# Patient Record
Sex: FEMALE | Race: WHITE | HISPANIC OR LATINO | Employment: OTHER | URBAN - METROPOLITAN AREA
[De-identification: names, ages, dates, MRNs, and addresses within clinical notes are randomized per-mention and may not be internally consistent; named-entity substitution may affect disease eponyms.]

---

## 2017-01-05 ENCOUNTER — GENERIC CONVERSION - ENCOUNTER (OUTPATIENT)
Dept: OTHER | Facility: OTHER | Age: 67
End: 2017-01-05

## 2017-01-09 ENCOUNTER — ALLSCRIPTS OFFICE VISIT (OUTPATIENT)
Dept: OTHER | Facility: OTHER | Age: 67
End: 2017-01-09

## 2017-01-09 LAB
FLUAV AG SPEC QL IA: NEGATIVE
INFLUENZA B AG (HISTORICAL): NEGATIVE

## 2017-02-03 ENCOUNTER — ALLSCRIPTS OFFICE VISIT (OUTPATIENT)
Dept: OTHER | Facility: OTHER | Age: 67
End: 2017-02-03

## 2017-02-10 DIAGNOSIS — E66.9 OBESITY: ICD-10-CM

## 2017-02-10 DIAGNOSIS — Z00.00 ENCOUNTER FOR GENERAL ADULT MEDICAL EXAMINATION WITHOUT ABNORMAL FINDINGS: ICD-10-CM

## 2017-02-10 DIAGNOSIS — R05.9 COUGH: ICD-10-CM

## 2017-02-10 DIAGNOSIS — E55.9 VITAMIN D DEFICIENCY: ICD-10-CM

## 2017-02-10 DIAGNOSIS — J02.9 ACUTE PHARYNGITIS: ICD-10-CM

## 2017-02-10 DIAGNOSIS — R73.9 HYPERGLYCEMIA: ICD-10-CM

## 2017-02-14 ENCOUNTER — ALLSCRIPTS OFFICE VISIT (OUTPATIENT)
Dept: OTHER | Facility: OTHER | Age: 67
End: 2017-02-14

## 2017-02-14 LAB — S PYO AG THROAT QL: NEGATIVE

## 2017-05-31 ENCOUNTER — APPOINTMENT (OUTPATIENT)
Dept: LAB | Facility: CLINIC | Age: 67
End: 2017-05-31
Payer: MEDICARE

## 2017-05-31 DIAGNOSIS — R73.9 HYPERGLYCEMIA: ICD-10-CM

## 2017-05-31 DIAGNOSIS — E55.9 VITAMIN D DEFICIENCY: ICD-10-CM

## 2017-05-31 DIAGNOSIS — Z00.00 ENCOUNTER FOR GENERAL ADULT MEDICAL EXAMINATION WITHOUT ABNORMAL FINDINGS: ICD-10-CM

## 2017-05-31 DIAGNOSIS — R76.0 RAISED ANTIBODY TITER: ICD-10-CM

## 2017-05-31 DIAGNOSIS — J02.9 ACUTE PHARYNGITIS: ICD-10-CM

## 2017-05-31 DIAGNOSIS — E66.9 OBESITY: ICD-10-CM

## 2017-05-31 LAB
25(OH)D3 SERPL-MCNC: 26.3 NG/ML (ref 30–100)
ALBUMIN SERPL BCP-MCNC: 3.8 G/DL (ref 3.5–5)
ALP SERPL-CCNC: 82 U/L (ref 46–116)
ALT SERPL W P-5'-P-CCNC: 21 U/L (ref 12–78)
ANION GAP SERPL CALCULATED.3IONS-SCNC: 4 MMOL/L (ref 4–13)
AST SERPL W P-5'-P-CCNC: 18 U/L (ref 5–45)
BASOPHILS # BLD AUTO: 0.04 THOUSANDS/ΜL (ref 0–0.1)
BASOPHILS NFR BLD AUTO: 1 % (ref 0–1)
BILIRUB SERPL-MCNC: 0.56 MG/DL (ref 0.2–1)
BUN SERPL-MCNC: 11 MG/DL (ref 5–25)
CALCIUM SERPL-MCNC: 9.3 MG/DL (ref 8.3–10.1)
CHLORIDE SERPL-SCNC: 110 MMOL/L (ref 100–108)
CHOLEST SERPL-MCNC: 227 MG/DL (ref 50–200)
CO2 SERPL-SCNC: 30 MMOL/L (ref 21–32)
CREAT SERPL-MCNC: 0.75 MG/DL (ref 0.6–1.3)
EOSINOPHIL # BLD AUTO: 0.26 THOUSAND/ΜL (ref 0–0.61)
EOSINOPHIL NFR BLD AUTO: 4 % (ref 0–6)
ERYTHROCYTE [DISTWIDTH] IN BLOOD BY AUTOMATED COUNT: 13 % (ref 11.6–15.1)
ERYTHROCYTE [SEDIMENTATION RATE] IN BLOOD: 11 MM/HOUR (ref 0–20)
EST. AVERAGE GLUCOSE BLD GHB EST-MCNC: 114 MG/DL
GFR SERPL CREATININE-BSD FRML MDRD: >60 ML/MIN/1.73SQ M
GLUCOSE P FAST SERPL-MCNC: 95 MG/DL (ref 65–99)
HBA1C MFR BLD: 5.6 % (ref 4.2–6.3)
HCT VFR BLD AUTO: 40.1 % (ref 34.8–46.1)
HDLC SERPL-MCNC: 46 MG/DL (ref 40–60)
HGB BLD-MCNC: 13.3 G/DL (ref 11.5–15.4)
LDLC SERPL CALC-MCNC: 130 MG/DL (ref 0–100)
LYMPHOCYTES # BLD AUTO: 2.32 THOUSANDS/ΜL (ref 0.6–4.47)
LYMPHOCYTES NFR BLD AUTO: 37 % (ref 14–44)
MCH RBC QN AUTO: 31.7 PG (ref 26.8–34.3)
MCHC RBC AUTO-ENTMCNC: 33.2 G/DL (ref 31.4–37.4)
MCV RBC AUTO: 96 FL (ref 82–98)
MONOCYTES # BLD AUTO: 0.48 THOUSAND/ΜL (ref 0.17–1.22)
MONOCYTES NFR BLD AUTO: 8 % (ref 4–12)
NEUTROPHILS # BLD AUTO: 3.25 THOUSANDS/ΜL (ref 1.85–7.62)
NEUTS SEG NFR BLD AUTO: 50 % (ref 43–75)
NRBC BLD AUTO-RTO: 0 /100 WBCS
PLATELET # BLD AUTO: 233 THOUSANDS/UL (ref 149–390)
PMV BLD AUTO: 12.8 FL (ref 8.9–12.7)
POTASSIUM SERPL-SCNC: 4.5 MMOL/L (ref 3.5–5.3)
PROT SERPL-MCNC: 7.2 G/DL (ref 6.4–8.2)
RBC # BLD AUTO: 4.2 MILLION/UL (ref 3.81–5.12)
SODIUM SERPL-SCNC: 144 MMOL/L (ref 136–145)
T4 FREE SERPL-MCNC: 0.7 NG/DL (ref 0.76–1.46)
TRIGL SERPL-MCNC: 256 MG/DL
TSH SERPL DL<=0.05 MIU/L-ACNC: 3.46 UIU/ML (ref 0.36–3.74)
WBC # BLD AUTO: 6.36 THOUSAND/UL (ref 4.31–10.16)

## 2017-05-31 PROCEDURE — 85025 COMPLETE CBC W/AUTO DIFF WBC: CPT

## 2017-05-31 PROCEDURE — 84443 ASSAY THYROID STIM HORMONE: CPT

## 2017-05-31 PROCEDURE — 80053 COMPREHEN METABOLIC PANEL: CPT

## 2017-05-31 PROCEDURE — 85652 RBC SED RATE AUTOMATED: CPT

## 2017-05-31 PROCEDURE — 84439 ASSAY OF FREE THYROXINE: CPT

## 2017-05-31 PROCEDURE — 80061 LIPID PANEL: CPT

## 2017-05-31 PROCEDURE — 83036 HEMOGLOBIN GLYCOSYLATED A1C: CPT

## 2017-05-31 PROCEDURE — 82306 VITAMIN D 25 HYDROXY: CPT

## 2017-05-31 PROCEDURE — 36415 COLL VENOUS BLD VENIPUNCTURE: CPT

## 2017-06-02 ENCOUNTER — GENERIC CONVERSION - ENCOUNTER (OUTPATIENT)
Dept: OTHER | Facility: OTHER | Age: 67
End: 2017-06-02

## 2017-08-15 ENCOUNTER — ALLSCRIPTS OFFICE VISIT (OUTPATIENT)
Dept: OTHER | Facility: OTHER | Age: 67
End: 2017-08-15

## 2017-08-15 DIAGNOSIS — R50.9 FEVER: ICD-10-CM

## 2017-08-15 DIAGNOSIS — R94.6 ABNORMAL RESULTS OF THYROID FUNCTION STUDIES: ICD-10-CM

## 2017-08-15 DIAGNOSIS — Z00.00 ENCOUNTER FOR GENERAL ADULT MEDICAL EXAMINATION WITHOUT ABNORMAL FINDINGS: ICD-10-CM

## 2017-08-15 DIAGNOSIS — N95.1 FEMALE CLIMACTERIC STATE: ICD-10-CM

## 2017-09-18 ENCOUNTER — GENERIC CONVERSION - ENCOUNTER (OUTPATIENT)
Dept: OTHER | Facility: OTHER | Age: 67
End: 2017-09-18

## 2017-12-05 ENCOUNTER — GENERIC CONVERSION - ENCOUNTER (OUTPATIENT)
Dept: OTHER | Facility: OTHER | Age: 67
End: 2017-12-05

## 2017-12-20 ENCOUNTER — GENERIC CONVERSION - ENCOUNTER (OUTPATIENT)
Dept: FAMILY MEDICINE CLINIC | Facility: CLINIC | Age: 67
End: 2017-12-20

## 2018-01-12 VITALS
OXYGEN SATURATION: 97 % | HEART RATE: 86 BPM | TEMPERATURE: 97.7 F | RESPIRATION RATE: 16 BRPM | DIASTOLIC BLOOD PRESSURE: 66 MMHG | WEIGHT: 176.38 LBS | BODY MASS INDEX: 33.3 KG/M2 | HEIGHT: 61 IN | SYSTOLIC BLOOD PRESSURE: 122 MMHG

## 2018-01-12 NOTE — MISCELLANEOUS
Message  left message to increase vit d 3 by 1,000 units/day      Signatures   Electronically signed by : Juan Jose Hoff DO; Jun 2 2017 10:29AM EST                       (Author)

## 2018-01-13 VITALS
SYSTOLIC BLOOD PRESSURE: 144 MMHG | HEIGHT: 61 IN | TEMPERATURE: 98.5 F | DIASTOLIC BLOOD PRESSURE: 80 MMHG | BODY MASS INDEX: 33.42 KG/M2 | RESPIRATION RATE: 16 BRPM | OXYGEN SATURATION: 94 % | WEIGHT: 177 LBS | HEART RATE: 82 BPM

## 2018-01-14 VITALS
DIASTOLIC BLOOD PRESSURE: 70 MMHG | SYSTOLIC BLOOD PRESSURE: 140 MMHG | HEIGHT: 61 IN | WEIGHT: 188.38 LBS | OXYGEN SATURATION: 95 % | TEMPERATURE: 98.5 F | RESPIRATION RATE: 16 BRPM | HEART RATE: 73 BPM | BODY MASS INDEX: 35.57 KG/M2

## 2018-01-14 VITALS
HEIGHT: 61 IN | HEART RATE: 56 BPM | OXYGEN SATURATION: 98 % | SYSTOLIC BLOOD PRESSURE: 122 MMHG | DIASTOLIC BLOOD PRESSURE: 70 MMHG | TEMPERATURE: 97.9 F | BODY MASS INDEX: 33.11 KG/M2 | WEIGHT: 175.38 LBS | RESPIRATION RATE: 16 BRPM

## 2018-01-23 NOTE — PROGRESS NOTES
Assessment   1  Dyspepsia (536 8) (R10 13)  2  DJD (degenerative joint disease) (715 90) (M19 90)  3  Hypertension (401 9) (I10)   · having hypotensive s/s - outpt readings  4  Dyslipidemia (272 4) (E78 5)  5  Lupus anticoagulant positive (795 79) (R76 0)  6  Sick sinus syndrome (427 81) (I49 5)    Plan  Dyslipidemia    · Renew: Rosuvastatin Calcium 5 MG Oral Tablet (Crestor); TAKE 1 TABLET DAILY  Dyspepsia    · Start: Omeprazole 20 MG Oral Capsule Delayed Release; TAKE 1 CAPSULE Daily  Encounter for gynecological examination    · (1) THIN PREP PAP WITH IMAGING; Status:Temporary Deferral - Pt refuses;     12/14/2017  PAP Maturation index required? : No  Reflex HPV? : Never  Health Maintenance    · *VB - Urinary Incontinence Screen (Dx Z13 89 Screen for UI); Status:Resulted - Requires  Verification;   Done: 67XQT4565 12:00AM  Hypertension    · Renew: Lisinopril 20 MG Oral Tablet; Take 1 tablet daily  PMH: Acute nonsuppurative otitis media of right ear    · Continue: Fluticasone Propionate 50 MCG/ACT Nasal Suspension (Flonase); USE 2  SPRAYS IN EACH NOSTRIL ONCE DAILY  Screening for osteoporosis    · * DXA BONE DENSITY SPINE HIP AND PELVIS; Status:Temporary Deferral - Pt refuses;     12/14/2017  Vertigo    · Continue: Meclizine HCl - 12 5 MG Oral Tablet; TAKE 1 TABLET 3 TIMES DAILY AS  NEEDED    Discussion/Summary    Rto 2 months  Impression: Subsequent Annual Wellness Visit  Cardiovascular screening and counseling: the risks and benefits of screening were discussed and screening is current  Diabetes screening and counseling: the risks and benefits of screening were discussed and screening is current  Colorectal cancer screening and counseling: the risks and benefits of screening were discussed and screening is current  Breast cancer screening and counseling: the risks and benefits of screening were discussed and screening is current     Cervical cancer screening and counseling: the risks and benefits of screening were discussed and the patient declines screening  Osteoporosis screening and counseling: the risks and benefits of screening were discussed and the patient declines screening  Glaucoma screening and counseling: the risks and benefits of screening were discussed and screening is current  Immunizations: the risks and benefits of influenza vaccination were discussed with the patient, the patient declines the influenza vaccination, the risks and benefits of pneumococcal vaccination were discussed with the patient and the patient declines the pneumococcal vaccination  Advance Directive Planning: not complete  Patient Discussion: plan discussed with the patient, follow-up visit needed in one year  Chief Complaint  patient presented here for annual well visit      History of Present Illness  Welcome to Medicare and Wellness Visits: The patient is being seen for the initial annual wellness visit  Medicare Screening and Risk Factors   Hospitalizations: she has been previously hospitalizied  Once per lifetime medicare screening tests: ECG  Medicare Screening Tests Risk Questions   Abdominal aortic aneurysm risk assessment: family history of AAA  Osteoporosis risk assessment: female gender and over 48years of age  HIV risk assessment: denies prbc's  Drug and Alcohol Use: The patient is a former cigarette smoker and quit 6 yrs ago 1/2ppd x 45 yrs  The patient reports occasional alcohol use  She has never used illicit drugs  Diet and Physical Activity: Current diet includes well balanced meals  She exercises daily  Exercise: walking, leg exercise  Mood Disorder and Cognitive Impairment Screening: She denies feeling down, depressed, or hopeless over the past two weeks  She denies feeling little interest or pleasure in doing things over the past two weeks     Cognitive impairment screening: denies difficulty learning/retaining new information, denies difficulty handling complex tasks, denies difficulty with reasoning, denies difficulty with spatial ability and orientation, denies difficulty with language and denies difficulty with behavior  Functional Ability/Level of Safety: Hearing is normal bilaterally and a hearing aid is not used  The patient is currently able to do activities of daily living without limitations, able to do instrumental activities of daily living without limitations, able to participate in social activities without limitations and able to drive without limitations  Activities of daily living details: does not need help using the phone, no transportation help needed, does not need help shopping, no meal preparation help needed, does not need help doing housework, does not need help doing laundry, does not need help managing medications and does not need help managing money  Fall risk factors:  mobility impairment, but no deconditioning, no visual impairment, no urinary incontinence, no cognitive impairment and no previous fall  Home safety risk factors:  has grab bars, but grab bars in the bathroom  Advance Directives: Advance directives: no living will and no advance directives  end of life decisions were reviewed with the patient and I agree with the patient's decisions  Co-Managers and Medical Equipment/Suppliers: See Patient Care Team      Patient Care Team    Care Team Member Role Specialty Office Number   Kaylyn MCCORD  Specialist Endocrinology [phone]   [facility] Specialist Hematology Oncology [phone] [address] [facility] Specialist Orthopedic Surgery [phone]     Review of Systems    Constitutional: negative  Eyes: negative  ENT: negative  Cardiovascular: negative  Respiratory: negative  Active Problems   1  Abnormal thyroid screen (blood) (794 5) (R94 6)  2  Anxiety (300 00) (F41 9)  3  Back pain (724 5) (M54 9)  4  Chronic back pain (724 5,338 29) (M54 9,G89 29)  5  DJD (degenerative joint disease) (715 90) (M19 90)  6  Dyslipidemia (272 4) (E78 5)  7  Eczema (692 9) (L30 9)  8  Encounter for screening mammogram for breast cancer (V76 12) (Z12 31)  9  History of bilateral knee replacement (V43 65) (Z96 653)  10  Hypertension (401 9) (I10)  11  Impaired fasting glucose (790 21) (R73 01)  12  Lupus anticoagulant positive (795 79) (R76 0)  13  Nasal polyp (471 9) (J33 9)  14  Other abnormal glucose (790 29) (R73 09)  15  Pharyngitis (462) (J02 9)  16  Prolonged PTT (790 92) (R79 1)  17  Seasonal allergies (477 9) (J30 2)  18  Sick sinus syndrome (427 81) (I49 5)  19  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)  20  Vertigo (780 4) (R42)  21   Vitamin D deficiency (268 9) (E55 9)    Past Medical History    · History of Abnormal findings on diagnostic imaging of other specified body structures  (793 99) (R93 8)   · History of Acute atopic conjunctivitis, unspecified laterality (372 05) (H10 10)   · History of Acute nonsuppurative otitis media of right ear (381 00) (H65 191)   · History of Acute pain of both knees (338 19,719 46) (M25 561,M25 562)   · History of Acute recurrent maxillary sinusitis (461 0) (J01 01)   · History of Allergic rhinitis due to pollen (477 0) (J30 1)   · History of Backache (724 5) (M54 9)   · History of Bursitis of hip, unspecified laterality (726 5) (M70 70)   · History of Dyspepsia (536 8) (K30)   · History of Dysuria (788 1) (R30 0)   · History of Ecchymosis (459 89) (R58)   · History of Hip Sprain (843 9)   · History of acute sinusitis (V12 69) (Z87 09)   · History of acute sinusitis (V12 69) (Z87 09)   · History of constipation (V12 79) (Z87 19)   · History of diarrhea (V12 79) (I81 876)   · History of fatigue (V13 89) (Z87 898)   · History of sinus bradycardia (V12 59) (Z86 79)   · History of upper respiratory infection (V12 09) (Z87 09)   · History of Leg swelling (729 81) (M79 89)   · History of Lump or mass in breast (611 72) (N63)   · History of Osteoarthritis (715 90) (M19 90)   · History of Polyp of sigmoid colon (211 3) (D12 5)   · History of Preoperative clearance (V72 84) (Z01 818)   · History of Temporomandibular dysfunction syndrome (524 60) (M26 609)   · History of Upper respiratory infection (465 9) (J06 9)   · History of URTI (acute upper respiratory infection) (465 9) (J06 9)   · History of Vaginitis (616 10) (N76 0)    The active problems and past medical history were reviewed and updated today  Surgical History    · History of Breast Surgery Lumpectomy   · History of Tubal Ligation    The surgical history was reviewed and updated today  Family History  Mother    · Family history of Mother  At Age 80  Father    · Family history of Father  At Age 67   · Family history of Type 2 diabetes mellitus without complication  Sister    · Family history of Type 2 diabetes mellitus without complication  Brother    · Family history of Type 2 diabetes mellitus without complication  Family History    · Family history of essential hypertension (V17 49) (Z82 49)   · Family history of Hay fever    The family history was reviewed and updated today  Social History    · Being A Social Drinker   · Daily caffeine consumption   · Denied: History of Drug use   · Former smoker (V15 82) (A65 435)   · 1/2 or less x 45yrs  The social history was reviewed and updated today  The social history was reviewed and is unchanged  Current Meds  1  Fluticasone Propionate 50 MCG/ACT Nasal Suspension; USE 2 SPRAYS IN EACH   NOSTRIL ONCE DAILY; Therapy: 06XSE9379 to (Silviano Olmedo)  Requested for: 75Bak1566; Last   Rx:2016 Ordered  2  Ibuprofen 800 MG Oral Tablet; take 1 tablet twice a day as needed; Therapy: 70Ifc2920 to (Last Rx:60Zyp3103)  Requested for: 75FVH4457 Ordered  3  Lidocaine 5 % External Patch; APPLY 1 PATCH TO THE AFFECTED AREA AND LEAVE   IN PLACE FOR 12 HOURS, THEN REMOVE AND LEAVE OFF FOR 12 HOURS;    Therapy: 27Nbs9034 to (Evaluate:72Yuq6940)  Requested for: 87Qij7930; Last   Rx:18Xin8139 Ordered  4  Lisinopril 20 MG Oral Tablet; Take 1 tablet daily; Therapy: 81Czp0721 to Recorded  5  Meclizine HCl - 12 5 MG Oral Tablet; TAKE 1 TABLET 3 TIMES DAILY AS NEEDED; Therapy: 91LUN5953 to (Jesuronel Multaniks)  Requested for: 74Pct0619; Last   Rx:00Ypb6275 Ordered  6  Motrin TABS Recorded  7  Rosuvastatin Calcium 5 MG Oral Tablet; TAKE 1 TABLET DAILY; Therapy: 53PAS0423 to (Evaluate:38Fci5778)  Requested for: 17Uih0028; Last   Rx:90Cdp6733 Ordered  8  Systane 0 4-0 3 % Ophthalmic Solution; INSTILL 1 DROP IN AFFECTED EYE(S) EVERY   4-6 HOURS; Therapy: 36VAO1900 to (Last MT:68SLG8336)  Requested for: 56QXM5095 Ordered  9  Vitamin D3 5000 UNIT Oral Capsule; take 1a day; Therapy: 04WVN3713 to (Last Rx:09Jun2016)  Requested for: 30OYS8657 Ordered    The medication list was reviewed and updated today  Allergies   1  Metoprolol Tartrate TABS  2  Amoxicillin TABS  3  Augmentin TABS  4  Lipitor TABS    Immunizations   1    PCV  Temporarily Deferred: Pt refuses    Td/DT  25-Sep-2006     Vitals  Signs    Systolic: 970  Diastolic: 65   Temperature: 99 5 F, Tympanic  Heart Rate: 72  Pulse Quality: Normal  Respiration Quality: Normal  Respiration: 16  Systolic: 348, LUE, Sitting  Diastolic: 76, LUE, Sitting  Height: 5 ft 1 in  Weight: 177 lb   BMI Calculated: 33 44  BSA Calculated: 1 79  O2 Saturation: 98    Physical Exam    Constitutional   General appearance: No acute distress, well appearing and well nourished  Head and Face   Head and face: Normal     Palpation of the face and sinuses: No sinus tenderness  Eyes   Conjunctiva and lids: No swelling, erythema or discharge  Pupils and irises: Equal, round, reactive to light  Ears, Nose, Mouth, and Throat   External inspection of ears and nose: Normal     Otoscopic examination: Tympanic membranes translucent with normal light reflex  Canals patent without erythema      Hearing: Normal     Nasal mucosa, septum, and turbinates: Normal without edema or erythema  Lips, teeth, and gums: Normal, good dentition  Oropharynx: Normal with no erythema, edema, exudate or lesions  Neck   Neck: Supple, symmetric, trachea midline, no masses  Thyroid: Normal, no thyromegaly  Pulmonary   Respiratory effort: No increased work of breathing or signs of respiratory distress  Palpation of chest: Normal     Auscultation of lungs: Clear to auscultation  Cardiovascular   Auscultation of heart: Normal rate and rhythm, normal S1 and S2, no murmurs  Carotid pulses: 2+ bilaterally  Results/Data  Falls Risk Assessment (Dx Z13 89 Screen for Neurologic Disorder) 70GPP5036 10:19AM User, Ahs     Test Name Result Flag Reference   Falls Risk      No falls in the past year     PHQ-2 Adult Depression Screening 29MQJ5936 10:19AM User, Ahs     Test Name Result Flag Reference   PHQ-2 Adult Depression Score 0     Over the last two weeks, how often have you been bothered by any of the following problems?   Little interest or pleasure in doing things: Not at all - 0  Feeling down, depressed, or hopeless: Not at all - 0   PHQ-2 Adult Depression Screening Negative       *VB - Urinary Incontinence Screen (Dx Z13 89 Screen for UI) 30HFK9086 12:00AM Odalys Damon   negative     Test Name Result Flag Reference   Urinary Incontinence Assessment [id number]         Future Appointments    Date/Time Provider Specialty Site   02/14/2017 10:00 AM Odalys Damon DO Internal Medicine [address] [place] [address]   Electronically signed by : Madiha Bustos DO; Dec 14 2016 11:50AM EST                       (Author)

## 2018-01-23 NOTE — MISCELLANEOUS
Message  discussed her holter  she just got back from [facility] and they recommend a pacer        Signatures   Electronically signed by : Francisca Maxwell DO; Dec  5 2017 10:39AM EST                       (Author)

## 2018-01-31 ENCOUNTER — OFFICE VISIT (OUTPATIENT)
Dept: FAMILY MEDICINE CLINIC | Facility: CLINIC | Age: 68
End: 2018-01-31
Payer: MEDICARE

## 2018-01-31 VITALS
HEART RATE: 63 BPM | RESPIRATION RATE: 16 BRPM | BODY MASS INDEX: 35.87 KG/M2 | TEMPERATURE: 99.3 F | OXYGEN SATURATION: 99 % | DIASTOLIC BLOOD PRESSURE: 68 MMHG | WEIGHT: 190 LBS | HEIGHT: 61 IN | SYSTOLIC BLOOD PRESSURE: 138 MMHG

## 2018-01-31 DIAGNOSIS — Z00.00 MEDICARE ANNUAL WELLNESS VISIT, SUBSEQUENT: ICD-10-CM

## 2018-01-31 DIAGNOSIS — B02.29 HZV (HERPES ZOSTER VIRUS) POST HERPETIC NEURALGIA: Primary | ICD-10-CM

## 2018-01-31 PROCEDURE — G0439 PPPS, SUBSEQ VISIT: HCPCS | Performed by: INTERNAL MEDICINE

## 2018-01-31 RX ORDER — OXYCODONE AND ACETAMINOPHEN 7.5; 325 MG/1; MG/1
1 TABLET ORAL EVERY 4 HOURS PRN
Qty: 30 TABLET | Refills: 0 | Status: SHIPPED | OUTPATIENT
Start: 2018-01-31 | End: 2018-08-01

## 2018-01-31 RX ORDER — MECLIZINE HCL 12.5 MG/1
1 TABLET ORAL 3 TIMES DAILY PRN
COMMUNITY
Start: 2016-05-06 | End: 2018-08-01

## 2018-01-31 RX ORDER — IBUPROFEN 800 MG/1
1 TABLET ORAL 2 TIMES DAILY PRN
COMMUNITY
Start: 2016-08-31 | End: 2019-04-01 | Stop reason: ALTCHOICE

## 2018-01-31 RX ORDER — FEXOFENADINE HYDROCHLORIDE 60 MG/1
60 TABLET, FILM COATED ORAL
COMMUNITY
Start: 2011-11-08 | End: 2020-03-19 | Stop reason: SDUPTHER

## 2018-01-31 RX ORDER — LISINOPRIL 20 MG/1
30 TABLET ORAL DAILY
COMMUNITY
Start: 2016-08-31 | End: 2018-10-30 | Stop reason: SDUPTHER

## 2018-01-31 RX ORDER — LIDOCAINE 50 MG/G
1 PATCH TOPICAL
COMMUNITY
Start: 2016-08-31 | End: 2020-07-20 | Stop reason: SDUPTHER

## 2018-01-31 RX ORDER — CAPSAICIN 0.025 %
CREAM (GRAM) TOPICAL
COMMUNITY
Start: 2018-01-29 | End: 2018-01-31

## 2018-01-31 RX ORDER — HYDROCODONE BITARTRATE AND ACETAMINOPHEN 5; 300 MG/1; MG/1
TABLET ORAL
Refills: 0 | COMMUNITY
Start: 2018-01-29 | End: 2018-08-01

## 2018-01-31 RX ORDER — OMEPRAZOLE 20 MG/1
1 CAPSULE, DELAYED RELEASE ORAL DAILY
COMMUNITY
Start: 2016-12-14 | End: 2020-03-19 | Stop reason: SDUPTHER

## 2018-01-31 RX ORDER — DULOXETIN HYDROCHLORIDE 60 MG/1
60 CAPSULE, DELAYED RELEASE ORAL DAILY
Qty: 30 CAPSULE | Refills: 5 | Status: SHIPPED | OUTPATIENT
Start: 2018-01-31 | End: 2018-08-01

## 2018-01-31 RX ORDER — ROSUVASTATIN CALCIUM 5 MG/1
1 TABLET, COATED ORAL DAILY
COMMUNITY
Start: 2016-07-25 | End: 2018-05-07 | Stop reason: SDUPTHER

## 2018-01-31 NOTE — PROGRESS NOTES
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  Comments:  tx with dome[place] soaks and add cymbalta for neuropathy    Medicare annual wellness visit, subsequent    Other orders  -     Cancel: Ambulatory referral to Gastroenterology; Future  -     Cancel: Ambulatory referral to Colorectal Surgery; Future  -     Cancel: Tdap vaccine greater than or equal to 8yo IM  -     Cancel: Ambulatory referral to Gastroenterology; Future  -     aspirin (ASPIRIN LOW DOSE) 81 MG tablet; Take by mouth  -     Discontinue: capsaicin (ZOSTRIX) 0 025 % cream; Apply topically  -     Cholecalciferol 1000 units capsule; Take 1,000 Units by mouth  -     fexofenadine (ALLEGRA) 60 MG tablet; Take 60 mg by mouth  -     HYDROcodone-acetaminophen (XODOL) 5-300 MG per tablet; take 1 tablet by mouth every 6 hours if needed for MODERATE PAIN,MAX DAILY AMOUNT:4 TABLETS  -     ibuprofen (MOTRIN) 800 mg tablet; Take 1 tablet by mouth 2 (two) times a day as needed  -     lidocaine (LIDODERM) 5 %; Apply 1 patch topically  -     lisinopril (ZESTRIL) 20 mg tablet; Take 1 tablet by mouth daily  -     meclizine (ANTIVERT) 12 5 MG tablet; Take 1 tablet by mouth 3 (three) times a day as needed  -     Multiple Vitamin (MULTIVITAMINS PO); Take by mouth  -     omeprazole (PriLOSEC) 20 mg delayed release capsule; Take 1 capsule by mouth daily  -     rosuvastatin (CRESTOR) 5 mg tablet; Take 1 tablet by mouth daily          Subjective:      Patient ID: Arya Zarco is a 79 y o  female  Pt has shingles and was diagnosed in er and was given valtrex  This was mid January  She was also given prednisone  She went back to er and given capsacian  She stopped because it burnt her  She was given oxycodone  She says it lasts 3 hrs            The following portions of the patient's history were reviewed and updated as appropriate: She  has a past medical history of Anxiety; Chronic back pain; Degenerative joint disease; Depression; Dyslipidemia; Hyperglycemia; Lupus anticoagulant positive; Osteoarthritis; Seasonal allergies; Sick sinus syndrome ([facility] [address] ); Sinus bradycardia; Tachycardia-bradycardia syndrome ([facility] [address] ); Total knee replacement status; and Vitamin D deficiency  She  does not have a problem list on file  She  has a past surgical history that includes Breast lumpectomy (Bilateral); Knee surgery; and Tubal ligation  Her family history includes Allergies in her family; Diabetes type II in her brother, father, and sister; Hypertension in her family; Stroke in her father and mother  She  reports that she has quit smoking  She has a 22 50 pack-year smoking history  She has never used smokeless tobacco  She reports that she drinks alcohol  She reports that she does not use drugs  Current Outpatient Prescriptions   Medication Sig Dispense Refill    aspirin (ASPIRIN LOW DOSE) 81 MG tablet Take by mouth      Cholecalciferol 1000 units capsule Take 1,000 Units by mouth      fexofenadine (ALLEGRA) 60 MG tablet Take 60 mg by mouth      HYDROcodone-acetaminophen (XODOL) 5-300 MG per tablet take 1 tablet by mouth every 6 hours if needed for MODERATE PAIN,MAX DAILY AMOUNT:4 TABLETS  0    ibuprofen (MOTRIN) 800 mg tablet Take 1 tablet by mouth 2 (two) times a day as needed      lidocaine (LIDODERM) 5 % Apply 1 patch topically      lisinopril (ZESTRIL) 20 mg tablet Take 1 tablet by mouth daily      meclizine (ANTIVERT) 12 5 MG tablet Take 1 tablet by mouth 3 (three) times a day as needed      Multiple Vitamin (MULTIVITAMINS PO) Take by mouth      omeprazole (PriLOSEC) 20 mg delayed release capsule Take 1 capsule by mouth daily      rosuvastatin (CRESTOR) 5 mg tablet Take 1 tablet by mouth daily       No current facility-administered medications for this visit  No current outpatient prescriptions on file prior to visit  No current facility-administered medications on file prior to visit  She is allergic to amoxicillin; atorvastatin; augmentin es-600  [amoxicillin-pot clavulanate]; metoprolol; penicillins; and wound dressings       Review of Systems   Constitutional: Negative  HENT: Negative  Respiratory: Negative  Cardiovascular: Negative  Skin: Positive for rash  All shingle lesions are crusted over  Objective:     Physical Exam   Constitutional: She appears well-developed and well-nourished  HENT:   Head: Normocephalic and atraumatic  Right Ear: External ear normal    Left Ear: External ear normal    Neck: Normal range of motion  Neck supple  Cardiovascular: Normal rate and regular rhythm  Pulmonary/Chest: Effort normal and breath sounds normal    Skin: Rash noted     Scabbed over shingles

## 2018-01-31 NOTE — PATIENT INSTRUCTIONS
Use [place] soaks and will increase her pain med to percocet  She knows do not drive with percocet    Add cymbalta for neuropathy

## 2018-03-16 ENCOUNTER — OFFICE VISIT (OUTPATIENT)
Dept: FAMILY MEDICINE CLINIC | Facility: CLINIC | Age: 68
End: 2018-03-16
Payer: MEDICARE

## 2018-03-16 VITALS
DIASTOLIC BLOOD PRESSURE: 88 MMHG | WEIGHT: 189.4 LBS | OXYGEN SATURATION: 98 % | SYSTOLIC BLOOD PRESSURE: 142 MMHG | HEART RATE: 62 BPM | BODY MASS INDEX: 35.76 KG/M2 | TEMPERATURE: 97.5 F | HEIGHT: 61 IN

## 2018-03-16 DIAGNOSIS — G56.01 CARPAL TUNNEL SYNDROME OF RIGHT WRIST: Primary | ICD-10-CM

## 2018-03-16 PROBLEM — M19.90 DJD (DEGENERATIVE JOINT DISEASE): Status: ACTIVE | Noted: 2017-10-21

## 2018-03-16 PROBLEM — Z95.0 PACEMAKER: Status: ACTIVE | Noted: 2018-03-08

## 2018-03-16 PROCEDURE — 99213 OFFICE O/P EST LOW 20 MIN: CPT | Performed by: INTERNAL MEDICINE

## 2018-03-16 RX ORDER — MELOXICAM 15 MG/1
15 TABLET ORAL DAILY
Qty: 30 TABLET | Refills: 0 | Status: SHIPPED | OUTPATIENT
Start: 2018-03-16 | End: 2018-08-01

## 2018-03-16 NOTE — PROGRESS NOTES
Assessment/Plan:         Diagnoses and all orders for this visit:    Carpal tunnel syndrome of right wrist  Comments:  splint/nsaid  consider tx for neuropathy  it is near dermatome that she had for shingles about 1 month ago  she declines duloxitene for now  Orders:  -     meloxicam (MOBIC) 15 mg tablet; Take 1 tablet (15 mg total) by mouth daily  -     Splint    Other orders  -     metoprolol tartrate (LOPRESSOR) 25 mg tablet; Take 25 mg by mouth 2 (two) times a day          Subjective:      Patient ID: Espinoza Jain is a 76 y o  female  Numb in middle 2 fingers  It is near the dermatome of her shingles  The following portions of the patient's history were reviewed and updated as appropriate: She  has a past medical history of Anxiety; Chronic back pain; Degenerative joint disease; Depression; Dyslipidemia; Hyperglycemia; Lupus anticoagulant positive; Osteoarthritis; Seasonal allergies; Sick sinus syndrome ([address] ); Sinus bradycardia; Tachycardia-bradycardia syndrome ([address] ); Total knee replacement status; and Vitamin D deficiency  She There are no active problems to display for this patient  She  has a past surgical history that includes Breast lumpectomy (Bilateral); Knee surgery; and Tubal ligation  Her family history includes Allergies in her family; Diabetes type II in her brother, father, and sister; Hypertension in her family; Stroke in her father and mother  She  reports that she has quit smoking  She has a 22 50 pack-year smoking history  She has never used smokeless tobacco  She reports that she drinks alcohol  She reports that she does not use drugs    Current Outpatient Prescriptions   Medication Sig Dispense Refill    lisinopril (ZESTRIL) 20 mg tablet Take 10 mg by mouth daily        aspirin (ASPIRIN LOW DOSE) 81 MG tablet Take by mouth      Cholecalciferol 1000 units capsule Take 1,000 Units by mouth      DULoxetine (CYMBALTA) 60 mg delayed release capsule Take 1 capsule (60 mg total) by mouth daily 30 capsule 5    fexofenadine (ALLEGRA) 60 MG tablet Take 60 mg by mouth      HYDROcodone-acetaminophen (XODOL) 5-300 MG per tablet take 1 tablet by mouth every 6 hours if needed for MODERATE PAIN,MAX DAILY AMOUNT:4 TABLETS  0    ibuprofen (MOTRIN) 800 mg tablet Take 1 tablet by mouth 2 (two) times a day as needed      lidocaine (LIDODERM) 5 % Apply 1 patch topically      meclizine (ANTIVERT) 12 5 MG tablet Take 1 tablet by mouth 3 (three) times a day as needed      meloxicam (MOBIC) 15 mg tablet Take 1 tablet (15 mg total) by mouth daily 30 tablet 0    metoprolol tartrate (LOPRESSOR) 25 mg tablet Take 25 mg by mouth 2 (two) times a day  0    Multiple Vitamin (MULTIVITAMINS PO) Take by mouth      omeprazole (PriLOSEC) 20 mg delayed release capsule Take 1 capsule by mouth daily      oxyCODONE-acetaminophen (PERCOCET) 7 5-325 MG per tablet Take 1 tablet by mouth every 4 (four) hours as needed for moderate pain Max Daily Amount: 6 tablets 30 tablet 0    rosuvastatin (CRESTOR) 5 mg tablet Take 1 tablet by mouth daily       No current facility-administered medications for this visit        Current Outpatient Prescriptions on File Prior to Visit   Medication Sig    lisinopril (ZESTRIL) 20 mg tablet Take 10 mg by mouth daily      aspirin (ASPIRIN LOW DOSE) 81 MG tablet Take by mouth    Cholecalciferol 1000 units capsule Take 1,000 Units by mouth    DULoxetine (CYMBALTA) 60 mg delayed release capsule Take 1 capsule (60 mg total) by mouth daily    fexofenadine (ALLEGRA) 60 MG tablet Take 60 mg by mouth    HYDROcodone-acetaminophen (XODOL) 5-300 MG per tablet take 1 tablet by mouth every 6 hours if needed for MODERATE PAIN,MAX DAILY AMOUNT:4 TABLETS    ibuprofen (MOTRIN) 800 mg tablet Take 1 tablet by mouth 2 (two) times a day as needed    lidocaine (LIDODERM) 5 % Apply 1 patch topically    meclizine (ANTIVERT) 12 5 MG tablet Take 1 tablet by mouth 3 (three) times a day as needed    Multiple Vitamin (MULTIVITAMINS PO) Take by mouth    omeprazole (PriLOSEC) 20 mg delayed release capsule Take 1 capsule by mouth daily    oxyCODONE-acetaminophen (PERCOCET) 7 5-325 MG per tablet Take 1 tablet by mouth every 4 (four) hours as needed for moderate pain Max Daily Amount: 6 tablets    rosuvastatin (CRESTOR) 5 mg tablet Take 1 tablet by mouth daily     No current facility-administered medications on file prior to visit  She is allergic to amoxicillin; atorvastatin; augmentin es-600  [amoxicillin-pot clavulanate]; metoprolol; penicillins; and wound dressings       Review of Systems   Constitutional: Negative  HENT: Negative  Respiratory: Negative  Cardiovascular: Negative  Objective:      /88 (BP Location: Left arm, Patient Position: Sitting, Cuff Size: Standard)   Pulse 62   Temp 97 5 °F (36 4 °C)   Ht 5' 1" (1 549 m)   Wt 85 9 kg (189 lb 6 4 oz)   SpO2 98%   BMI 35 79 kg/m²          Physical Exam   Constitutional: She appears well-developed and well-nourished  HENT:   Head: Normocephalic and atraumatic  Neck: Normal range of motion  Neck supple  Cardiovascular: Normal rate and regular rhythm  Pulmonary/Chest: Effort normal and breath sounds normal    Musculoskeletal:   Pain with palp of her carpal tunnel

## 2018-03-16 NOTE — PATIENT INSTRUCTIONS
If pain persists told will order emg to r/o cts  If it is negative tx for neuropathy but she declines med for now  Asked her to call next week if pain persists and consider emb to r/o cts    If neg need to readdress neuropathy tx

## 2018-05-07 DIAGNOSIS — E78.5 DYSLIPIDEMIA: Primary | ICD-10-CM

## 2018-05-09 RX ORDER — ROSUVASTATIN CALCIUM 5 MG/1
5 TABLET, COATED ORAL DAILY
Qty: 30 TABLET | Refills: 0 | Status: SHIPPED | OUTPATIENT
Start: 2018-05-09 | End: 2018-05-14

## 2018-05-14 ENCOUNTER — TELEPHONE (OUTPATIENT)
Dept: FAMILY MEDICINE CLINIC | Facility: CLINIC | Age: 68
End: 2018-05-14

## 2018-05-14 DIAGNOSIS — E78.5 DYSLIPIDEMIA: Primary | ICD-10-CM

## 2018-05-14 RX ORDER — ROSUVASTATIN CALCIUM 5 MG/1
5 TABLET, COATED ORAL DAILY
Refills: 0 | Status: CANCELLED | OUTPATIENT
Start: 2018-05-14

## 2018-05-14 NOTE — TELEPHONE ENCOUNTER
PATIENT RECEIVED ROSUVASTATIN FROM [facility] SHE ONLY TAKES CRESTOR  CAN YOU PLEASE SEND KAMRAN 5M OZIEL KLEIN #90 TO [facility]

## 2018-05-16 RX ORDER — ROSUVASTATIN CALCIUM 5 MG/1
5 TABLET, COATED ORAL DAILY
Qty: 90 TABLET | Refills: 1 | Status: SHIPPED | OUTPATIENT
Start: 2018-05-16 | End: 2018-08-01

## 2018-05-16 NOTE — TELEPHONE ENCOUNTER
Patient returned your call, has been taking crestor for years prescribed by you  Patient is allergic to lipitor and does not want the generic crestor because she already knows she can tollerate the brand  [facility]  Brand Necessary

## 2018-08-01 ENCOUNTER — OFFICE VISIT (OUTPATIENT)
Dept: FAMILY MEDICINE CLINIC | Facility: CLINIC | Age: 68
End: 2018-08-01
Payer: MEDICARE

## 2018-08-01 VITALS
OXYGEN SATURATION: 94 % | SYSTOLIC BLOOD PRESSURE: 160 MMHG | BODY MASS INDEX: 35.5 KG/M2 | HEIGHT: 61 IN | WEIGHT: 188 LBS | DIASTOLIC BLOOD PRESSURE: 68 MMHG | RESPIRATION RATE: 16 BRPM | TEMPERATURE: 98.3 F | HEART RATE: 64 BPM

## 2018-08-01 DIAGNOSIS — I10 HYPERTENSION, UNSPECIFIED TYPE: ICD-10-CM

## 2018-08-01 DIAGNOSIS — K43.9 VENTRAL HERNIA WITHOUT OBSTRUCTION OR GANGRENE: ICD-10-CM

## 2018-08-01 DIAGNOSIS — Z13.820 SCREENING FOR OSTEOPOROSIS: Primary | ICD-10-CM

## 2018-08-01 PROCEDURE — 99214 OFFICE O/P EST MOD 30 MIN: CPT | Performed by: INTERNAL MEDICINE

## 2018-08-01 RX ORDER — LISINOPRIL 40 MG/1
40 TABLET ORAL DAILY
Qty: 30 TABLET | Refills: 1 | Status: SHIPPED | OUTPATIENT
Start: 2018-08-01 | End: 2018-08-30

## 2018-08-01 RX ORDER — ERGOCALCIFEROL 1.25 MG/1
50000 CAPSULE ORAL WEEKLY
Refills: 0 | COMMUNITY
Start: 2018-07-13 | End: 2018-12-20

## 2018-08-01 RX ORDER — ALPRAZOLAM 0.5 MG/1
TABLET ORAL
Refills: 0 | COMMUNITY
Start: 2018-04-28 | End: 2018-12-20

## 2018-08-01 NOTE — PATIENT INSTRUCTIONS
Consult gen surgery  Reviewed her lab  Will order u/s since it is located at her ru    Gave her dr Taz Hurley #

## 2018-08-01 NOTE — PROGRESS NOTES
Assessment/Plan:         Diagnoses and all orders for this visit:    Screening for osteoporosis  -     DXA bone density spine hip and pelvis; Future    Sliding hiatal hernia  Comments:  consult surgery    Other orders  -     ALPRAZolam (XANAX) 0 5 mg tablet; TAKE 1 TABLET NIGHT BEFORE PROCEDURE AND 1 TABLET 1 HR BEFORE PROCEDURE  -     ergocalciferol (VITAMIN D2) 50,000 units; Take 50,000 Units by mouth once a week          Subjective:      Patient ID: Belle Santo is a 76 y o  female  Pt developed abd pain  She feels a buldging in her rt upper quadrant  If she lays on her back the ridge goes away and she is more comfortable  She does not feel food aggravates her back pain  She gets pain with lifting - especially lifting around 30 to 50 lbs  If she lays on her rt lateral side it hurts and she can feel the bulge  She also gets occasional nausea  The following portions of the patient's history were reviewed and updated as appropriate:   She  has a past medical history of Anxiety; Chronic back pain; Degenerative joint disease; Depression; Dyslipidemia; Hyperglycemia; Lupus anticoagulant positive; Osteoarthritis; Seasonal allergies; Sick sinus syndrome ([address] ); Sinus bradycardia; Tachycardia-bradycardia syndrome ([address] ); Total knee replacement status; and Vitamin D deficiency  She   Patient Active Problem List    Diagnosis Date Noted    Pacemaker 03/08/2018    DJD (degenerative joint disease) 10/21/2017    Mixed hyperlipidemia 10/06/2016    Hypertension 06/07/2012     She  has a past surgical history that includes Breast lumpectomy (Bilateral); Knee surgery; and Tubal ligation  Her family history includes Allergies in her family; Diabetes type II in her brother, father, and sister; Hypertension in her family; Stroke in her father and mother  She  reports that she has quit smoking  She has a 22 50 pack-year smoking history  She has never used smokeless tobacco  She reports that she drinks alcohol  She reports that she does not use drugs  Current Outpatient Prescriptions   Medication Sig Dispense Refill    aspirin (ASPIRIN LOW DOSE) 81 MG tablet Take by mouth      ergocalciferol (VITAMIN D2) 50,000 units Take 50,000 Units by mouth once a week  0    fexofenadine (ALLEGRA) 60 MG tablet Take 60 mg by mouth      ibuprofen (MOTRIN) 800 mg tablet Take 1 tablet by mouth 2 (two) times a day as needed      lidocaine (LIDODERM) 5 % Apply 1 patch topically      lisinopril (ZESTRIL) 20 mg tablet Take 30 mg by mouth daily        Multiple Vitamin (MULTIVITAMINS PO) Take by mouth      omeprazole (PriLOSEC) 20 mg delayed release capsule Take 1 capsule by mouth daily      ALPRAZolam (XANAX) 0 5 mg tablet TAKE 1 TABLET NIGHT BEFORE PROCEDURE AND 1 TABLET 1 HR BEFORE PROCEDURE  0    Cholecalciferol 1000 units capsule Take 1,000 Units by mouth      metoprolol tartrate (LOPRESSOR) 25 mg tablet Take 25 mg by mouth 2 (two) times a day  0     No current facility-administered medications for this visit        Current Outpatient Prescriptions on File Prior to Visit   Medication Sig    aspirin (ASPIRIN LOW DOSE) 81 MG tablet Take by mouth    fexofenadine (ALLEGRA) 60 MG tablet Take 60 mg by mouth    ibuprofen (MOTRIN) 800 mg tablet Take 1 tablet by mouth 2 (two) times a day as needed    lidocaine (LIDODERM) 5 % Apply 1 patch topically    lisinopril (ZESTRIL) 20 mg tablet Take 30 mg by mouth daily      Multiple Vitamin (MULTIVITAMINS PO) Take by mouth    omeprazole (PriLOSEC) 20 mg delayed release capsule Take 1 capsule by mouth daily    Cholecalciferol 1000 units capsule Take 1,000 Units by mouth    metoprolol tartrate (LOPRESSOR) 25 mg tablet Take 25 mg by mouth 2 (two) times a day    [DISCONTINUED] DULoxetine (CYMBALTA) 60 mg delayed release capsule Take 1 capsule (60 mg total) by mouth daily    [DISCONTINUED] HYDROcodone-acetaminophen (XODOL) 5-300 MG per tablet take 1 tablet by mouth every 6 hours if needed for MODERATE PAIN,MAX DAILY AMOUNT:4 TABLETS    [DISCONTINUED] meclizine (ANTIVERT) 12 5 MG tablet Take 1 tablet by mouth 3 (three) times a day as needed    [DISCONTINUED] meloxicam (MOBIC) 15 mg tablet Take 1 tablet (15 mg total) by mouth daily    [DISCONTINUED] oxyCODONE-acetaminophen (PERCOCET) 7 5-325 MG per tablet Take 1 tablet by mouth every 4 (four) hours as needed for moderate pain Max Daily Amount: 6 tablets    [DISCONTINUED] rosuvastatin (CRESTOR) 5 mg tablet Take 1 tablet (5 mg total) by mouth daily Please dispense crestor and not generic     No current facility-administered medications on file prior to visit  She is allergic to amoxicillin; atorvastatin; augmentin es-600  [amoxicillin-pot clavulanate]; metoprolol; penicillins; latex; and wound dressings       Review of Systems   Constitutional: Negative  HENT: Negative  Respiratory: Negative  Cardiovascular: Negative  Gastrointestinal: Positive for abdominal distention and abdominal pain  Objective:      /68 (BP Location: Left arm, Patient Position: Sitting, Cuff Size: Large)   Pulse 64   Temp 98 3 °F (36 8 °C) (Tympanic)   Resp 16   Ht 5' 1" (1 549 m)   Wt 85 3 kg (188 lb)   SpO2 94%   BMI 35 52 kg/m²          Physical Exam   Constitutional: She appears well-developed and well-nourished  HENT:   Head: Normocephalic and atraumatic  Right Ear: External ear normal    Left Ear: External ear normal    Nose: Nose normal    Mouth/Throat: Oropharynx is clear and moist    Neck: Normal range of motion  Neck supple  Cardiovascular: Normal rate and regular rhythm      Pulmonary/Chest: Effort normal and breath sounds normal

## 2018-08-30 ENCOUNTER — OFFICE VISIT (OUTPATIENT)
Dept: FAMILY MEDICINE CLINIC | Facility: CLINIC | Age: 68
End: 2018-08-30
Payer: MEDICARE

## 2018-08-30 VITALS
DIASTOLIC BLOOD PRESSURE: 72 MMHG | HEIGHT: 61 IN | OXYGEN SATURATION: 98 % | TEMPERATURE: 98.2 F | WEIGHT: 186 LBS | RESPIRATION RATE: 16 BRPM | BODY MASS INDEX: 35.12 KG/M2 | SYSTOLIC BLOOD PRESSURE: 118 MMHG | HEART RATE: 72 BPM

## 2018-08-30 DIAGNOSIS — R42 VERTIGO: Primary | ICD-10-CM

## 2018-08-30 DIAGNOSIS — J32.9 SINUSITIS, UNSPECIFIED CHRONICITY, UNSPECIFIED LOCATION: ICD-10-CM

## 2018-08-30 PROCEDURE — 99213 OFFICE O/P EST LOW 20 MIN: CPT | Performed by: INTERNAL MEDICINE

## 2018-08-30 RX ORDER — MOMETASONE FUROATE 1 MG/G
CREAM TOPICAL
COMMUNITY
End: 2018-12-20 | Stop reason: SDUPTHER

## 2018-08-30 RX ORDER — MECLIZINE HCL 12.5 MG/1
12.5 TABLET ORAL 3 TIMES DAILY PRN
Qty: 30 TABLET | Refills: 1 | Status: SHIPPED | OUTPATIENT
Start: 2018-08-30 | End: 2021-11-04 | Stop reason: SDUPTHER

## 2018-08-30 RX ORDER — CEFPROZIL 500 MG/1
500 TABLET, FILM COATED ORAL 2 TIMES DAILY
Qty: 20 TABLET | Refills: 0 | Status: SHIPPED | OUTPATIENT
Start: 2018-08-30 | End: 2018-09-09

## 2018-08-30 NOTE — PROGRESS NOTES
Assessment/Plan:         Diagnoses and all orders for this visit:    Vertigo  -     meclizine (ANTIVERT) 12 5 MG tablet; Take 1 tablet (12 5 mg total) by mouth 3 (three) times a day as needed for dizziness  -     cefprosil (CEFZIL) 500 MG tablet; Take 1 tablet (500 mg total) by mouth 2 (two) times a day for 10 days    Sinusitis, unspecified chronicity, unspecified location    Other orders  -     mometasone (ELOCON) 0 1 % cream; APPLY SPARINGLY ONCE OR TWICE DAILY AS NEEDED  Subjective:      Patient ID: Josie Cedeño is a 76 y o  female  Pt complains of vertigo  She started with ear discomfort and then stuffiness in her sinus  denines n/v  She relates her meclizine is old  +lightheaded  The following portions of the patient's history were reviewed and updated as appropriate:   She  has a past medical history of Anxiety; Chronic back pain; Degenerative joint disease; Depression; Dyslipidemia; Hiatal hernia; Hyperglycemia; Lupus anticoagulant positive; Osteoarthritis; Seasonal allergies; Sick sinus syndrome ([address] ); Sinus bradycardia; Tachycardia-bradycardia syndrome ([address] ); Total knee replacement status; and Vitamin D deficiency  She   Patient Active Problem List    Diagnosis Date Noted    Pacemaker 03/08/2018    DJD (degenerative joint disease) 10/21/2017    Mixed hyperlipidemia 10/06/2016    Hypertension 06/07/2012     She  has a past surgical history that includes Breast lumpectomy (Bilateral); Knee surgery; and Tubal ligation  Her family history includes Allergies in her family; Diabetes type II in her brother, father, and sister; Hypertension in her family; Stroke in her father and mother  She  reports that she has quit smoking  She has a 22 50 pack-year smoking history  She has never used smokeless tobacco  She reports that she drinks alcohol  She reports that she does not use drugs    Current Outpatient Prescriptions   Medication Sig Dispense Refill    ergocalciferol (VITAMIN D2) 50,000 units Take 50,000 Units by mouth once a week  0    fexofenadine (ALLEGRA) 60 MG tablet Take 60 mg by mouth      ibuprofen (MOTRIN) 800 mg tablet Take 1 tablet by mouth 2 (two) times a day as needed      lidocaine (LIDODERM) 5 % Apply 1 patch topically      lisinopril (ZESTRIL) 20 mg tablet Take 30 mg by mouth daily        mometasone (ELOCON) 0 1 % cream APPLY SPARINGLY ONCE OR TWICE DAILY AS NEEDED   Multiple Vitamin (MULTIVITAMINS PO) Take by mouth      omeprazole (PriLOSEC) 20 mg delayed release capsule Take 1 capsule by mouth daily      ALPRAZolam (XANAX) 0 5 mg tablet TAKE 1 TABLET NIGHT BEFORE PROCEDURE AND 1 TABLET 1 HR BEFORE PROCEDURE  0    aspirin (ASPIRIN LOW DOSE) 81 MG tablet Take by mouth      cefprosil (CEFZIL) 500 MG tablet Take 1 tablet (500 mg total) by mouth 2 (two) times a day for 10 days 20 tablet 0    Cholecalciferol 1000 units capsule Take 1,000 Units by mouth      meclizine (ANTIVERT) 12 5 MG tablet Take 1 tablet (12 5 mg total) by mouth 3 (three) times a day as needed for dizziness 30 tablet 1     No current facility-administered medications for this visit        Current Outpatient Prescriptions on File Prior to Visit   Medication Sig    ergocalciferol (VITAMIN D2) 50,000 units Take 50,000 Units by mouth once a week    fexofenadine (ALLEGRA) 60 MG tablet Take 60 mg by mouth    ibuprofen (MOTRIN) 800 mg tablet Take 1 tablet by mouth 2 (two) times a day as needed    lidocaine (LIDODERM) 5 % Apply 1 patch topically    lisinopril (ZESTRIL) 20 mg tablet Take 30 mg by mouth daily      Multiple Vitamin (MULTIVITAMINS PO) Take by mouth    omeprazole (PriLOSEC) 20 mg delayed release capsule Take 1 capsule by mouth daily    ALPRAZolam (XANAX) 0 5 mg tablet TAKE 1 TABLET NIGHT BEFORE PROCEDURE AND 1 TABLET 1 HR BEFORE PROCEDURE    aspirin (ASPIRIN LOW DOSE) 81 MG tablet Take by mouth    Cholecalciferol 1000 units capsule Take 1,000 Units by mouth     No current facility-administered medications on file prior to visit  She is allergic to amoxicillin; atorvastatin; augmentin es-600  [amoxicillin-pot clavulanate]; lisinopril; metoprolol; penicillins; latex; and wound dressings       Review of Systems   Constitutional: Negative  HENT: Positive for ear pain and sinus pressure  Respiratory: Negative  Cardiovascular: Negative  Neurological: Positive for light-headedness  Objective:      /72 (BP Location: Left arm, Patient Position: Sitting, Cuff Size: Standard)   Pulse 72   Temp 98 2 °F (36 8 °C) (Tympanic)   Resp 16   Ht 5' 1" (1 549 m)   Wt 84 4 kg (186 lb)   SpO2 98%   BMI 35 14 kg/m²          Physical Exam   Constitutional: She appears well-developed and well-nourished  HENT:   Head: Normocephalic and atraumatic  Right Ear: External ear normal    Left Ear: External ear normal    Nose: Nose normal    Mouth/Throat: Oropharynx is clear and moist    Neck: Normal range of motion  Neck supple  Cardiovascular: Normal rate, regular rhythm and normal heart sounds      Pulmonary/Chest: Effort normal and breath sounds normal

## 2018-10-30 DIAGNOSIS — I10 ESSENTIAL HYPERTENSION: Primary | ICD-10-CM

## 2018-10-30 RX ORDER — LISINOPRIL 20 MG/1
30 TABLET ORAL DAILY
Qty: 135 TABLET | Refills: 1 | Status: SHIPPED | OUTPATIENT
Start: 2018-10-30 | End: 2019-02-11 | Stop reason: SDUPTHER

## 2018-12-20 ENCOUNTER — OFFICE VISIT (OUTPATIENT)
Dept: FAMILY MEDICINE CLINIC | Facility: CLINIC | Age: 68
End: 2018-12-20
Payer: MEDICARE

## 2018-12-20 VITALS
DIASTOLIC BLOOD PRESSURE: 78 MMHG | WEIGHT: 186 LBS | TEMPERATURE: 97.9 F | HEART RATE: 61 BPM | OXYGEN SATURATION: 98 % | RESPIRATION RATE: 16 BRPM | HEIGHT: 61 IN | SYSTOLIC BLOOD PRESSURE: 140 MMHG | BODY MASS INDEX: 35.12 KG/M2

## 2018-12-20 DIAGNOSIS — R42 VERTIGO: Primary | ICD-10-CM

## 2018-12-20 DIAGNOSIS — L30.9 ECZEMA, UNSPECIFIED TYPE: Primary | ICD-10-CM

## 2018-12-20 DIAGNOSIS — J06.9 UPPER RESPIRATORY TRACT INFECTION, UNSPECIFIED TYPE: ICD-10-CM

## 2018-12-20 DIAGNOSIS — R52 PAIN: Primary | ICD-10-CM

## 2018-12-20 PROCEDURE — 99213 OFFICE O/P EST LOW 20 MIN: CPT | Performed by: INTERNAL MEDICINE

## 2018-12-20 RX ORDER — RIBOFLAVIN (VITAMIN B2) 100 MG
100 TABLET ORAL DAILY
COMMUNITY
End: 2019-10-24

## 2018-12-20 RX ORDER — CEFPROZIL 500 MG/1
500 TABLET, FILM COATED ORAL 2 TIMES DAILY
Qty: 20 TABLET | Refills: 0 | Status: SHIPPED | OUTPATIENT
Start: 2018-12-20 | End: 2018-12-30

## 2018-12-20 RX ORDER — MOMETASONE FUROATE 1 MG/G
CREAM TOPICAL DAILY
Qty: 45 G | Refills: 0 | Status: SHIPPED | OUTPATIENT
Start: 2018-12-20 | End: 2020-05-08 | Stop reason: SDUPTHER

## 2018-12-20 RX ORDER — LORATADINE AND PSEUDOEPHEDRINE 10; 240 MG/1; MG/1
1 TABLET, EXTENDED RELEASE ORAL DAILY
COMMUNITY
End: 2020-03-19 | Stop reason: ALTCHOICE

## 2018-12-20 RX ORDER — LIDOCAINE 50 MG/G
1 PATCH TOPICAL DAILY
Qty: 90 PATCH | Refills: 0 | Status: SHIPPED | OUTPATIENT
Start: 2018-12-20 | End: 2019-04-01 | Stop reason: SDUPTHER

## 2018-12-20 RX ORDER — MECLIZINE HCL 12.5 MG/1
12.5 TABLET ORAL 3 TIMES DAILY PRN
Qty: 30 TABLET | Refills: 0 | Status: SHIPPED | OUTPATIENT
Start: 2018-12-20 | End: 2019-04-01 | Stop reason: SDUPTHER

## 2018-12-20 NOTE — PROGRESS NOTES
Assessment/Plan:         Diagnoses and all orders for this visit:    Vertigo  -     Ambulatory referral to Physical Therapy; Future  -     Ambulatory Referral to Otolaryngology; Future  -     meclizine (ANTIVERT) 12 5 MG tablet; Take 1 tablet (12 5 mg total) by mouth 3 (three) times a day as needed for dizziness Do not drive with med    Upper respiratory tract infection, unspecified type  -     cefprosil (CEFZIL) 500 MG tablet; Take 1 tablet (500 mg total) by mouth 2 (two) times a day for 10 days    Other orders  -     Cancel: Ambulatory referral to Colorectal Surgery; Future  -     loratadine-pseudoephedrine (CLARITIN-D 24-HOUR)  mg per 24 hr tablet; Take 1 tablet by mouth daily  -     Ascorbic Acid (VITAMIN C) 100 MG tablet; Take 100 mg by mouth daily          Subjective:      Patient ID: Author Arana is a 76 y o  female  2 weeks she has had vertigo  She went to er and was given meclizine and antibiotics  Denies seeing ent or pt in the past   Denies n/v  The following portions of the patient's history were reviewed and updated as appropriate:   She  has a past medical history of Anxiety; Chronic back pain; Degenerative joint disease; Depression; Dyslipidemia; Hiatal hernia; Hyperglycemia; Lupus anticoagulant positive; Osteoarthritis; Seasonal allergies; Sick sinus syndrome ([address] ); Sinus bradycardia; Tachycardia-bradycardia syndrome ([address] ); Total knee replacement status; and Vitamin D deficiency  She   Patient Active Problem List    Diagnosis Date Noted    Pacemaker 03/08/2018    DJD (degenerative joint disease) 10/21/2017    Mixed hyperlipidemia 10/06/2016    Hypertension 06/07/2012     She  has a past surgical history that includes Breast lumpectomy (Bilateral); Knee surgery; and Tubal ligation  Her family history includes Allergies in her family; Diabetes type II in her brother, father, and sister; Hypertension in her family; Stroke in her father and mother    She  reports that she has quit smoking  She has a 22 50 pack-year smoking history  She has never used smokeless tobacco  She reports that she drinks alcohol  She reports that she does not use drugs  Current Outpatient Prescriptions   Medication Sig Dispense Refill    Ascorbic Acid (VITAMIN C) 100 MG tablet Take 100 mg by mouth daily      Cholecalciferol 1000 units capsule Take 3,000 Units by mouth        lidocaine (LIDODERM) 5 % Apply 1 patch topically      lisinopril (ZESTRIL) 20 mg tablet Take 1 5 tablets (30 mg total) by mouth daily 135 tablet 1    loratadine-pseudoephedrine (CLARITIN-D 24-HOUR)  mg per 24 hr tablet Take 1 tablet by mouth daily      meclizine (ANTIVERT) 12 5 MG tablet Take 1 tablet (12 5 mg total) by mouth 3 (three) times a day as needed for dizziness 30 tablet 1    Multiple Vitamin (MULTIVITAMINS PO) Take by mouth      omeprazole (PriLOSEC) 20 mg delayed release capsule Take 1 capsule by mouth daily      aspirin (ASPIRIN LOW DOSE) 81 MG tablet Take by mouth      cefprosil (CEFZIL) 500 MG tablet Take 1 tablet (500 mg total) by mouth 2 (two) times a day for 10 days 20 tablet 0    fexofenadine (ALLEGRA) 60 MG tablet Take 60 mg by mouth      ibuprofen (MOTRIN) 800 mg tablet Take 1 tablet by mouth 2 (two) times a day as needed      lidocaine (LIDODERM) 5 % Apply 1 patch topically daily Remove & Discard patch within 12 hours or as directed by MD [place] patch 0    meclizine (ANTIVERT) 12 5 MG tablet Take 1 tablet (12 5 mg total) by mouth 3 (three) times a day as needed for dizziness Do not drive with med 30 tablet 0    mometasone (ELOCON) 0 1 % cream Apply topically daily 45 g 0     No current facility-administered medications for this visit        Current Outpatient Prescriptions on File Prior to Visit   Medication Sig    Cholecalciferol 1000 units capsule Take 3,000 Units by mouth      lidocaine (LIDODERM) 5 % Apply 1 patch topically    lisinopril (ZESTRIL) 20 mg tablet Take 1 5 tablets (30 mg total) by mouth daily    meclizine (ANTIVERT) 12 5 MG tablet Take 1 tablet (12 5 mg total) by mouth 3 (three) times a day as needed for dizziness    Multiple Vitamin (MULTIVITAMINS PO) Take by mouth    omeprazole (PriLOSEC) 20 mg delayed release capsule Take 1 capsule by mouth daily    aspirin (ASPIRIN LOW DOSE) 81 MG tablet Take by mouth    fexofenadine (ALLEGRA) 60 MG tablet Take 60 mg by mouth    ibuprofen (MOTRIN) 800 mg tablet Take 1 tablet by mouth 2 (two) times a day as needed     No current facility-administered medications on file prior to visit  She is allergic to amoxicillin; atorvastatin; augmentin es-600  [amoxicillin-pot clavulanate]; lisinopril; metoprolol; penicillins; latex; and wound dressings       Review of Systems   Constitutional: Negative  HENT: Negative  Respiratory: Negative  Cardiovascular: Negative  Neurological: Negative for headaches  Objective:      /78 (BP Location: Left arm, Patient Position: Sitting, Cuff Size: Large)   Pulse 61   Temp 97 9 °F (36 6 °C) (Tympanic)   Resp 16   Ht 5' 1" (1 549 m)   Wt 84 4 kg (186 lb)   SpO2 98%   BMI 35 14 kg/m²          Physical Exam   Constitutional: She appears well-developed and well-nourished  HENT:   Head: Normocephalic and atraumatic  Right Ear: External ear normal    Left Ear: External ear normal    Nose: Nose normal    Mouth/Throat: Oropharynx is clear and moist    Neck: Normal range of motion  Neck supple  Cardiovascular: Normal rate, regular rhythm and normal heart sounds      Pulmonary/Chest: Effort normal and breath sounds normal    Neurological:   horiz nystagmus

## 2019-01-10 ENCOUNTER — EVALUATION (OUTPATIENT)
Dept: PHYSICAL THERAPY | Facility: CLINIC | Age: 69
End: 2019-01-10
Payer: MEDICARE

## 2019-01-10 DIAGNOSIS — R42 VERTIGO: ICD-10-CM

## 2019-01-10 PROCEDURE — 97162 PT EVAL MOD COMPLEX 30 MIN: CPT | Performed by: PHYSICAL THERAPIST

## 2019-01-14 ENCOUNTER — APPOINTMENT (OUTPATIENT)
Dept: PHYSICAL THERAPY | Facility: CLINIC | Age: 69
End: 2019-01-14
Payer: MEDICARE

## 2019-01-14 NOTE — PROGRESS NOTES
PT Evaluation     Today's date: 1/10/2019  Patient name: Michael Landry  : 1950  MRN: [id number]  Referring provider: Shaquille Allen DO  Dx:   Encounter Diagnosis     ICD-10-CM    1  Vertigo R42 Ambulatory referral to Physical Therapy                  Assessment  Assessment details: Patient presents to physical therapy with increased occurrence of vertigo symptoms, related to change in position in bed and onset of sinus infection  Patient had positive right [employer] test with right upbeating torsional  nystagmus, indicative of a right posterior canalithiasis BPPV  Completed Epley maneuver with repeat [place] hallpike test negative for nystagmus but did have dizziness  Plan to complete further oculomotor and vestibular testing at next session  Patient will benefit from physical therapy to decrease vertigo symptoms with changes in position and preventing further reoccurrence of positional vertigo  Physical therapy interventions will include treatment for BPPV if symptoms continue at next session with a positive [place] hallpike  Oculomotor and balance testing will also be performed at next session to assess for other vestibular dysfunction  Understanding of Dx/Px/POC: good   Prognosis: good    Goals  ST  Patient will decrease positional vertigo symptoms when lying on right side to 1x a week in 2-4 weeks  2  Patient will be able to bend down to the floor with no vertigo symptoms in 2-4 weeks  3  Patient will be independent with HEP in 2-4 weeks  LT  Patient will experience no vertigo symptoms with changes in position in 6-8 weeks  2  Patient will be independent with HEP in 6-8 weeks  Plan  Plan details: Plan for next session: Follow up with patient about ENT visit  Re test [employer] and perform Epley if positive  Perform oculomotor and balance testing     Planned therapy interventions: canalith repositioning, balance, home exercise program and neuromuscular re-education  Frequency: 2x week  Duration in weeks: 6  Plan of Care beginning date: 1/10/2019  Plan of Care expiration date: 4/10/2019  Treatment plan discussed with: patient        Subjective Evaluation    History of Present Illness  Date of onset: 12/10/2018  Mechanism of injury: Patient reports dizziness, starting when she feels like she is getting a cold  It does not happen all of the time and is not severe during the day  Taking medication (Meclizine) for years to help with vertigo and takes only when she gets dizzy  She notices the dizziness whenever she has an inner ear infection  She notices lately when in bed and going to sleep when turning on the right side, she notices dizziness  When she turns all the way to the right side, it goes away  This started about a month ago  Patient reports having a pacemaker a year ago and bilateral knee replacements  Sometimes she notices headaches from sinus pressure  She also notices very occasional neck pain, however unrelated to dizziness/vertigo symptoms  Patient has appointment with ENT today, has not had prior ENT visit for this concern  Recurrent probem    Pain  Current pain ratin  At best pain ratin  At worst pain ratin  Location: cervical     Patient Goals  Patient/family treatment goals: decrease vertigo symptoms and know how to treat to improve when symptoms come on  Objective         Dysequilibrium: No  Lightheadedness: No  Vertigo: Yes  Rocking or Swaying: Yes         Oscillopsia: No  Diplopia: No  Motion sickness: No  Floating, Swimming, Disconnected: No    Exacerbation Factors:  Bending over: Yes  Turning Head: No  Rolling in bed: Yes  Walking: No  Looking up: Yes  Supine to/from sitting: Yes  Optokinetic movement: No  Walking in busy environment: No    Duration of Symptoms:lasts seconds       Concurrent Complaints:  Tinnitus:No   Patient reports feeling a whoosh, wind moving sound during dizziness episodes     Aural Fullness: Yes  Known hearing loss:No  Nausea, Vomiting: No  Altered Vision: Yes  Poor Concentration: No  Memory Loss: No  Peripheral Neuropathy:No  Cervical Pain: Yes   Headache: No      PHYSICAL FINDINGS:    Oculomotor ROM: deferred   Resting nystagmus: deferred  Gaze holding nystagmus: deferred    Smooth pursuit: deferred    Vertical Saccades: deferred  Horizontal Saccades: deferred  Convergence:deferred    Cover/Uncover/Crosscover Test: deferred    Head thrust (room light): deferred     Dynamic Visual Acuity: deferred   Dynamic Head: 20/  Static Head: 20/      MCTSIB: deferred   eyes open firm surface    eyes closed form surface   eyes open foam surface   eyes closed foam surface      DHI: 38   0-30 mild , 30-60 moderate,  severe disability      Positional testing: Right Left   [place] upbeating torsional nystagmus (20 seconds) NT   Roll test: NT NT       Cervical ROM: WNL   Flexion:  Extension:  Right rotation:  Left rotation:  Right lateral flexion:  Left lateral flexion:      Precautions: Hypertension, Pacemaker     Daily Treatment Diary     Manual  1/10/19            Epley Maneuver 2x                                                                     Exercise Diary                                                                                                                                                                                                                                                                                      Modalities

## 2019-01-16 ENCOUNTER — TELEPHONE (OUTPATIENT)
Dept: FAMILY MEDICINE CLINIC | Facility: CLINIC | Age: 69
End: 2019-01-16

## 2019-01-16 DIAGNOSIS — F41.9 ANXIETY: Primary | ICD-10-CM

## 2019-01-16 RX ORDER — ALPRAZOLAM 0.25 MG/1
0.25 TABLET ORAL
Qty: 30 TABLET | Refills: 0 | Status: SHIPPED | OUTPATIENT
Start: 2019-01-16 | End: 2019-07-17 | Stop reason: SDUPTHER

## 2019-01-16 NOTE — TELEPHONE ENCOUNTER
Patient was in last month and had discussed getting alprazolam for her flight at the end of the month  Wanted to know if she could get that prescription?     [facility] - [place]

## 2019-01-18 ENCOUNTER — APPOINTMENT (OUTPATIENT)
Dept: PHYSICAL THERAPY | Facility: CLINIC | Age: 69
End: 2019-01-18
Payer: MEDICARE

## 2019-01-21 ENCOUNTER — OFFICE VISIT (OUTPATIENT)
Dept: PHYSICAL THERAPY | Facility: CLINIC | Age: 69
End: 2019-01-21
Payer: MEDICARE

## 2019-01-21 DIAGNOSIS — R42 VERTIGO: Primary | ICD-10-CM

## 2019-01-21 PROCEDURE — 97112 NEUROMUSCULAR REEDUCATION: CPT | Performed by: PHYSICAL THERAPIST

## 2019-01-21 NOTE — PROGRESS NOTES
Daily Note     Today's date: 2019  Patient name: Lizy Monroy  : 1950  MRN: [id number]  Referring provider: Dustin Schmitt DO  Dx:   Encounter Diagnosis     ICD-10-CM    1  Vertigo R42        Start Time: 1100  Stop Time: 1115  Total time in clinic (min): 15 minutes    Subjective: Patient reports feeling normal again  She felt better immediately after the initial evaluation  She saw the ENT the same day as the evaluation, but felt much better there  She also had a hearing test done they noticed hearing loss on the right  She has not had any dizziness with turning in bed or bending to the floor  Objective: See treatment diary below    Positional testing: Right Left   [place] Negative  NT   Roll test: NT NT         Assessment: Patient presents with improvements in vertigo symptoms, especially with turning in bend and bending to the floor  Rechecked right [place] hallpike test which did not produce vertigo or nystagmus  Advised patient to return for physical therapy if vertigo symptoms return  Plan: On hold for 30 days and then patient will be discharged from physical therapy

## 2019-02-11 DIAGNOSIS — I10 ESSENTIAL HYPERTENSION: ICD-10-CM

## 2019-02-11 RX ORDER — LISINOPRIL 20 MG/1
30 TABLET ORAL DAILY
Qty: 135 TABLET | Refills: 1 | Status: SHIPPED | OUTPATIENT
Start: 2019-02-11 | End: 2020-01-02 | Stop reason: SDUPTHER

## 2019-04-01 ENCOUNTER — OFFICE VISIT (OUTPATIENT)
Dept: FAMILY MEDICINE CLINIC | Facility: CLINIC | Age: 69
End: 2019-04-01
Payer: MEDICARE

## 2019-04-01 VITALS
OXYGEN SATURATION: 98 % | HEIGHT: 61 IN | DIASTOLIC BLOOD PRESSURE: 86 MMHG | HEART RATE: 63 BPM | SYSTOLIC BLOOD PRESSURE: 160 MMHG | BODY MASS INDEX: 34.74 KG/M2 | RESPIRATION RATE: 16 BRPM | WEIGHT: 184 LBS | TEMPERATURE: 98.2 F

## 2019-04-01 DIAGNOSIS — Z86.79 HISTORY OF SINUS BRADYCARDIA: ICD-10-CM

## 2019-04-01 DIAGNOSIS — E78.2 MIXED HYPERLIPIDEMIA: ICD-10-CM

## 2019-04-01 DIAGNOSIS — I10 HYPERTENSION, UNSPECIFIED TYPE: ICD-10-CM

## 2019-04-01 DIAGNOSIS — M79.662 PAIN IN BOTH LOWER LEGS: Primary | ICD-10-CM

## 2019-04-01 DIAGNOSIS — M79.661 PAIN IN BOTH LOWER LEGS: Primary | ICD-10-CM

## 2019-04-01 DIAGNOSIS — I35.0 MODERATE AORTIC STENOSIS: ICD-10-CM

## 2019-04-01 DIAGNOSIS — Z83.3 FAMILY HISTORY OF DIABETES MELLITUS (DM): ICD-10-CM

## 2019-04-01 PROBLEM — Z96.653 HISTORY OF BILATERAL KNEE REPLACEMENT: Status: ACTIVE | Noted: 2019-04-01

## 2019-04-01 PROBLEM — H81.10 BPV (BENIGN POSITIONAL VERTIGO), UNSPECIFIED LATERALITY: Status: ACTIVE | Noted: 2019-04-01

## 2019-04-01 PROCEDURE — 99214 OFFICE O/P EST MOD 30 MIN: CPT | Performed by: PHYSICIAN ASSISTANT

## 2019-04-02 ENCOUNTER — APPOINTMENT (OUTPATIENT)
Dept: LAB | Facility: CLINIC | Age: 69
End: 2019-04-02
Payer: MEDICARE

## 2019-04-02 DIAGNOSIS — I10 HYPERTENSION, UNSPECIFIED TYPE: ICD-10-CM

## 2019-04-02 LAB
ALBUMIN SERPL BCP-MCNC: 4.1 G/DL (ref 3.5–5)
ALP SERPL-CCNC: 76 U/L (ref 46–116)
ALT SERPL W P-5'-P-CCNC: 25 U/L (ref 12–78)
ANION GAP SERPL CALCULATED.3IONS-SCNC: 6 MMOL/L (ref 4–13)
AST SERPL W P-5'-P-CCNC: 20 U/L (ref 5–45)
BASOPHILS # BLD AUTO: 0.03 THOUSANDS/ΜL (ref 0–0.1)
BASOPHILS NFR BLD AUTO: 1 % (ref 0–1)
BILIRUB SERPL-MCNC: 0.61 MG/DL (ref 0.2–1)
BUN SERPL-MCNC: 14 MG/DL (ref 5–25)
CALCIUM SERPL-MCNC: 8.7 MG/DL (ref 8.3–10.1)
CHLORIDE SERPL-SCNC: 109 MMOL/L (ref 100–108)
CHOLEST SERPL-MCNC: 223 MG/DL (ref 50–200)
CO2 SERPL-SCNC: 26 MMOL/L (ref 21–32)
CREAT SERPL-MCNC: 0.76 MG/DL (ref 0.6–1.3)
EOSINOPHIL # BLD AUTO: 0.14 THOUSAND/ΜL (ref 0–0.61)
EOSINOPHIL NFR BLD AUTO: 2 % (ref 0–6)
ERYTHROCYTE [DISTWIDTH] IN BLOOD BY AUTOMATED COUNT: 11.9 % (ref 11.6–15.1)
EST. AVERAGE GLUCOSE BLD GHB EST-MCNC: 114 MG/DL
GFR SERPL CREATININE-BSD FRML MDRD: 80 ML/MIN/1.73SQ M
GLUCOSE P FAST SERPL-MCNC: 94 MG/DL (ref 65–99)
HBA1C MFR BLD: 5.6 % (ref 4.2–6.3)
HCT VFR BLD AUTO: 41.2 % (ref 34.8–46.1)
HDLC SERPL-MCNC: 41 MG/DL (ref 40–60)
HGB BLD-MCNC: 13.5 G/DL (ref 11.5–15.4)
IMM GRANULOCYTES # BLD AUTO: 0.02 THOUSAND/UL (ref 0–0.2)
IMM GRANULOCYTES NFR BLD AUTO: 0 % (ref 0–2)
LDLC SERPL CALC-MCNC: 127 MG/DL (ref 0–100)
LYMPHOCYTES # BLD AUTO: 1.85 THOUSANDS/ΜL (ref 0.6–4.47)
LYMPHOCYTES NFR BLD AUTO: 32 % (ref 14–44)
MCH RBC QN AUTO: 31.6 PG (ref 26.8–34.3)
MCHC RBC AUTO-ENTMCNC: 32.8 G/DL (ref 31.4–37.4)
MCV RBC AUTO: 97 FL (ref 82–98)
MONOCYTES # BLD AUTO: 0.4 THOUSAND/ΜL (ref 0.17–1.22)
MONOCYTES NFR BLD AUTO: 7 % (ref 4–12)
NEUTROPHILS # BLD AUTO: 3.3 THOUSANDS/ΜL (ref 1.85–7.62)
NEUTS SEG NFR BLD AUTO: 58 % (ref 43–75)
NONHDLC SERPL-MCNC: 182 MG/DL
NRBC BLD AUTO-RTO: 0 /100 WBCS
PLATELET # BLD AUTO: 216 THOUSANDS/UL (ref 149–390)
PMV BLD AUTO: 12.5 FL (ref 8.9–12.7)
POTASSIUM SERPL-SCNC: 3.7 MMOL/L (ref 3.5–5.3)
PROT SERPL-MCNC: 7.3 G/DL (ref 6.4–8.2)
RBC # BLD AUTO: 4.27 MILLION/UL (ref 3.81–5.12)
SODIUM SERPL-SCNC: 141 MMOL/L (ref 136–145)
TRIGL SERPL-MCNC: 273 MG/DL
TSH SERPL DL<=0.05 MIU/L-ACNC: 2.84 UIU/ML (ref 0.36–3.74)
WBC # BLD AUTO: 5.74 THOUSAND/UL (ref 4.31–10.16)

## 2019-04-02 PROCEDURE — 84443 ASSAY THYROID STIM HORMONE: CPT | Performed by: PHYSICIAN ASSISTANT

## 2019-04-02 PROCEDURE — 80061 LIPID PANEL: CPT | Performed by: PHYSICIAN ASSISTANT

## 2019-04-02 PROCEDURE — 83036 HEMOGLOBIN GLYCOSYLATED A1C: CPT | Performed by: PHYSICIAN ASSISTANT

## 2019-04-02 PROCEDURE — 85025 COMPLETE CBC W/AUTO DIFF WBC: CPT | Performed by: PHYSICIAN ASSISTANT

## 2019-04-02 PROCEDURE — 80053 COMPREHEN METABOLIC PANEL: CPT | Performed by: PHYSICIAN ASSISTANT

## 2019-04-02 PROCEDURE — 36415 COLL VENOUS BLD VENIPUNCTURE: CPT | Performed by: PHYSICIAN ASSISTANT

## 2019-04-08 ENCOUNTER — OFFICE VISIT (OUTPATIENT)
Dept: FAMILY MEDICINE CLINIC | Facility: CLINIC | Age: 69
End: 2019-04-08
Payer: MEDICARE

## 2019-04-08 ENCOUNTER — APPOINTMENT (OUTPATIENT)
Dept: RADIOLOGY | Facility: MEDICAL CENTER | Age: 69
End: 2019-04-08
Payer: MEDICARE

## 2019-04-08 VITALS
SYSTOLIC BLOOD PRESSURE: 140 MMHG | HEIGHT: 60 IN | WEIGHT: 185 LBS | OXYGEN SATURATION: 94 % | BODY MASS INDEX: 36.32 KG/M2 | HEART RATE: 60 BPM | RESPIRATION RATE: 16 BRPM | DIASTOLIC BLOOD PRESSURE: 70 MMHG | TEMPERATURE: 97.5 F

## 2019-04-08 DIAGNOSIS — I73.9 CLAUDICATION (HCC): ICD-10-CM

## 2019-04-08 DIAGNOSIS — E78.5 DYSLIPIDEMIA: ICD-10-CM

## 2019-04-08 DIAGNOSIS — R06.00 DOE (DYSPNEA ON EXERTION): ICD-10-CM

## 2019-04-08 DIAGNOSIS — I49.5 SSS (SICK SINUS SYNDROME) (HCC): ICD-10-CM

## 2019-04-08 DIAGNOSIS — Z11.59 ENCOUNTER FOR HEPATITIS C SCREENING TEST FOR LOW RISK PATIENT: ICD-10-CM

## 2019-04-08 DIAGNOSIS — M79.604 PAIN IN BOTH LOWER EXTREMITIES: ICD-10-CM

## 2019-04-08 DIAGNOSIS — Z12.39 SCREENING FOR BREAST CANCER: ICD-10-CM

## 2019-04-08 DIAGNOSIS — M79.605 PAIN IN BOTH LOWER EXTREMITIES: ICD-10-CM

## 2019-04-08 DIAGNOSIS — Z00.00 MEDICARE ANNUAL WELLNESS VISIT, SUBSEQUENT: Primary | ICD-10-CM

## 2019-04-08 PROCEDURE — G0439 PPPS, SUBSEQ VISIT: HCPCS | Performed by: INTERNAL MEDICINE

## 2019-04-08 PROCEDURE — 71046 X-RAY EXAM CHEST 2 VIEWS: CPT

## 2019-04-08 PROCEDURE — 93000 ELECTROCARDIOGRAM COMPLETE: CPT | Performed by: INTERNAL MEDICINE

## 2019-04-08 PROCEDURE — 99214 OFFICE O/P EST MOD 30 MIN: CPT | Performed by: INTERNAL MEDICINE

## 2019-04-08 RX ORDER — ROSUVASTATIN CALCIUM 5 MG/1
5 TABLET, COATED ORAL DAILY
COMMUNITY
End: 2019-04-08 | Stop reason: SDUPTHER

## 2019-04-08 RX ORDER — ROSUVASTATIN CALCIUM 5 MG/1
5 TABLET, COATED ORAL DAILY
Qty: 90 TABLET | Refills: 1 | Status: SHIPPED | OUTPATIENT
Start: 2019-04-08 | End: 2020-05-08

## 2019-04-08 RX ORDER — ROSUVASTATIN CALCIUM 5 MG/1
5 TABLET, COATED ORAL DAILY
COMMUNITY
End: 2019-04-08

## 2019-04-15 ENCOUNTER — HOSPITAL ENCOUNTER (OUTPATIENT)
Dept: NON INVASIVE DIAGNOSTICS | Facility: CLINIC | Age: 69
Discharge: HOME | End: 2019-04-15
Payer: MEDICARE

## 2019-04-15 DIAGNOSIS — M79.604 PAIN IN BOTH LOWER EXTREMITIES: ICD-10-CM

## 2019-04-15 DIAGNOSIS — M79.605 PAIN IN BOTH LOWER EXTREMITIES: ICD-10-CM

## 2019-04-15 PROCEDURE — 93922 UPR/L XTREMITY ART 2 LEVELS: CPT | Performed by: SURGERY

## 2019-04-15 PROCEDURE — 93925 LOWER EXTREMITY STUDY: CPT

## 2019-04-15 PROCEDURE — 93925 LOWER EXTREMITY STUDY: CPT | Performed by: SURGERY

## 2019-04-15 PROCEDURE — 93923 UPR/LXTR ART STDY 3+ LVLS: CPT

## 2019-05-22 DIAGNOSIS — Z12.39 SCREENING FOR BREAST CANCER: ICD-10-CM

## 2019-05-24 ENCOUNTER — TELEPHONE (OUTPATIENT)
Dept: FAMILY MEDICINE CLINIC | Facility: CLINIC | Age: 69
End: 2019-05-24

## 2019-05-24 DIAGNOSIS — R92.8 ABNORMAL MAMMOGRAM: Primary | ICD-10-CM

## 2019-06-01 DIAGNOSIS — R92.8 ABNORMAL MAMMOGRAM: ICD-10-CM

## 2019-06-26 DIAGNOSIS — R52 PAIN: ICD-10-CM

## 2019-06-27 RX ORDER — LIDOCAINE 50 MG/G
1 PATCH TOPICAL DAILY
Qty: 30 PATCH | Refills: 3 | Status: SHIPPED | OUTPATIENT
Start: 2019-06-27 | End: 2019-10-24

## 2019-07-17 DIAGNOSIS — F41.9 ANXIETY: ICD-10-CM

## 2019-07-17 RX ORDER — ALPRAZOLAM 0.25 MG/1
TABLET ORAL
Qty: 30 TABLET | Refills: 0 | Status: SHIPPED | OUTPATIENT
Start: 2019-07-17 | End: 2021-11-04 | Stop reason: SDUPTHER

## 2019-07-17 NOTE — TELEPHONE ENCOUNTER
Patient called again looking for refill  PDMP check  Last fill date 1/2019 by our office  No other refills on site history

## 2019-10-24 ENCOUNTER — OFFICE VISIT (OUTPATIENT)
Dept: FAMILY MEDICINE CLINIC | Facility: CLINIC | Age: 69
End: 2019-10-24
Payer: MEDICARE

## 2019-10-24 VITALS
WEIGHT: 181 LBS | BODY MASS INDEX: 35.53 KG/M2 | HEART RATE: 65 BPM | OXYGEN SATURATION: 98 % | TEMPERATURE: 98.1 F | HEIGHT: 60 IN | SYSTOLIC BLOOD PRESSURE: 142 MMHG | DIASTOLIC BLOOD PRESSURE: 70 MMHG | RESPIRATION RATE: 16 BRPM

## 2019-10-24 DIAGNOSIS — Z13.820 SCREENING FOR OSTEOPOROSIS: ICD-10-CM

## 2019-10-24 DIAGNOSIS — M19.041 OSTEOARTHRITIS OF RIGHT HAND, UNSPECIFIED OSTEOARTHRITIS TYPE: Primary | ICD-10-CM

## 2019-10-24 DIAGNOSIS — Z23 ENCOUNTER FOR IMMUNIZATION: ICD-10-CM

## 2019-10-24 PROCEDURE — 90662 IIV NO PRSV INCREASED AG IM: CPT | Performed by: INTERNAL MEDICINE

## 2019-10-24 PROCEDURE — 99213 OFFICE O/P EST LOW 20 MIN: CPT | Performed by: INTERNAL MEDICINE

## 2019-10-24 PROCEDURE — G0008 ADMIN INFLUENZA VIRUS VAC: HCPCS | Performed by: INTERNAL MEDICINE

## 2019-10-24 RX ORDER — MAG HYDROX/ALUMINUM HYD/SIMETH 400-400-40
SUSPENSION, ORAL (FINAL DOSE FORM) ORAL
COMMUNITY

## 2019-10-24 RX ORDER — MELOXICAM 15 MG/1
15 TABLET ORAL DAILY
Qty: 30 TABLET | Refills: 1 | Status: SHIPPED | OUTPATIENT
Start: 2019-10-24 | End: 2020-05-08

## 2019-10-24 NOTE — PROGRESS NOTES
Assessment/Plan:         Diagnoses and all orders for this visit:    Osteoarthritis of right hand, unspecified osteoarthritis type  Comments:  prn mobic  Orders:  -     meloxicam (MOBIC) 15 mg tablet; Take 1 tablet (15 mg total) by mouth daily    Screening for osteoporosis    Encounter for immunization  -     influenza vaccine, [id number], high-dose, PF 0 5 mL (FLUZONE HIGH-DOSE)    Other orders  -     Cancel: DXA bone density spine hip and pelvis; Future  -     Cholecalciferol (VITAMIN D3) 5000 units CAPS; Take by mouth  -     Cancel: TB Skin Test          Subjective:      Patient ID: Crispin Juarez is a 71 y o  female  Pt has hand pain  Today it is okay  Denies trauma  Denies erythema or swelling      The following portions of the patient's history were reviewed and updated as appropriate: She  has a past medical history of Anxiety, Chronic back pain, Degenerative joint disease, Depression, Dyslipidemia, Hiatal hernia, Hyperglycemia, Lupus anticoagulant positive, Osteoarthritis, Seasonal allergies, Sick sinus syndrome (HCC), Sinus bradycardia, Tachycardia-bradycardia syndrome ([address] ), Total knee replacement status, and Vitamin D deficiency  She   Patient Active Problem List    Diagnosis Date Noted    SSS (sick sinus syndrome) ([facility] [address] ) 04/08/2019    BPV (benign positional vertigo), unspecified laterality 04/01/2019    History of sinus bradycardia 04/01/2019    Pain in both lower legs 04/01/2019    History of bilateral knee replacement 04/01/2019    Pacemaker 03/08/2018    DJD (degenerative joint disease) 10/21/2017    Mixed hyperlipidemia 10/06/2016    Moderate aortic stenosis 10/06/2016    Hypertension 06/07/2012     She  has a past surgical history that includes Breast lumpectomy (Bilateral); Knee surgery; and Tubal ligation  Her family history includes Allergies in her family; Diabetes type II in her brother, father, and sister; Hypertension in her family; Stroke in her father and mother    She  reports that she has quit smoking  She has a 22 50 pack-year smoking history  She has never used smokeless tobacco  She reports that she drinks alcohol  She reports that she does not use drugs  Current Outpatient Medications   Medication Sig Dispense Refill    ALPRAZolam (XANAX) 0 25 mg tablet One tab one hour before plane flight 30 tablet 0    Cholecalciferol (VITAMIN D3) 5000 units CAPS Take by mouth      Cyanocobalamin (VITAMIN B-12 PO) Take by mouth      fexofenadine (ALLEGRA) 60 MG tablet Take 60 mg by mouth      lidocaine (LIDODERM) 5 % Apply 1 patch topically      lisinopril (ZESTRIL) 20 mg tablet Take 1 5 tablets (30 mg total) by mouth daily 135 tablet 1    loratadine-pseudoephedrine (CLARITIN-D 24-HOUR)  mg per 24 hr tablet Take 1 tablet by mouth daily      meclizine (ANTIVERT) 12 5 MG tablet Take 1 tablet (12 5 mg total) by mouth 3 (three) times a day as needed for dizziness 30 tablet 1    mometasone (ELOCON) 0 1 % cream Apply topically daily 45 g 0    Multiple Vitamin (MULTIVITAMINS PO) Take by mouth      omeprazole (PriLOSEC) 20 mg delayed release capsule Take 1 capsule by mouth daily      meloxicam (MOBIC) 15 mg tablet Take 1 tablet (15 mg total) by mouth daily 30 tablet 1    rosuvastatin (CRESTOR) 5 mg tablet Take 1 tablet (5 mg total) by mouth daily (Patient not taking: Reported on 10/24/2019) 90 tablet 1     No current facility-administered medications for this visit        Current Outpatient Medications on File Prior to Visit   Medication Sig    ALPRAZolam (XANAX) 0 25 mg tablet One tab one hour before plane flight    Cholecalciferol (VITAMIN D3) 5000 units CAPS Take by mouth    Cyanocobalamin (VITAMIN B-12 PO) Take by mouth    fexofenadine (ALLEGRA) 60 MG tablet Take 60 mg by mouth    lidocaine (LIDODERM) 5 % Apply 1 patch topically    lisinopril (ZESTRIL) 20 mg tablet Take 1 5 tablets (30 mg total) by mouth daily    loratadine-pseudoephedrine (CLARITIN-D 24-HOUR)  mg per 24 hr tablet Take 1 tablet by mouth daily    meclizine (ANTIVERT) 12 5 MG tablet Take 1 tablet (12 5 mg total) by mouth 3 (three) times a day as needed for dizziness    mometasone (ELOCON) 0 1 % cream Apply topically daily    Multiple Vitamin (MULTIVITAMINS PO) Take by mouth    omeprazole (PriLOSEC) 20 mg delayed release capsule Take 1 capsule by mouth daily    rosuvastatin (CRESTOR) 5 mg tablet Take 1 tablet (5 mg total) by mouth daily (Patient not taking: Reported on 10/24/2019)     No current facility-administered medications on file prior to visit  She is allergic to amoxicillin; atorvastatin; augmentin es-600  [amoxicillin-pot clavulanate]; lisinopril; metoprolol; penicillins; latex; and wound dressings       Review of Systems   Constitutional: Negative  HENT: Negative  Respiratory: Negative  Cardiovascular: Negative  Musculoskeletal: Positive for arthralgias  Objective:      /70 (BP Location: Left arm, Patient Position: Sitting, Cuff Size: Large)   Pulse 65   Temp 98 1 °F (36 7 °C) (Tympanic)   Resp 16   Ht 5' (1 524 m)   Wt 82 1 kg (181 lb)   SpO2 98%   BMI 35 35 kg/m²          Physical Exam   Constitutional: She appears well-developed and well-nourished  No distress  HENT:   Head: Normocephalic and atraumatic  Right Ear: External ear normal    Left Ear: External ear normal    Nose: Nose normal    Mouth/Throat: Oropharynx is clear and moist  No oropharyngeal exudate  Neck: Normal range of motion  Neck supple  No tracheal deviation present  No thyromegaly present  Cardiovascular: Normal rate, regular rhythm, normal heart sounds and intact distal pulses  Exam reveals no gallop and no friction rub  No murmur heard  Pulmonary/Chest: Effort normal and breath sounds normal  No respiratory distress  She has no wheezes  She has no rales  She exhibits no tenderness  Lymphadenopathy:     She has no cervical adenopathy  Skin: She is not diaphoretic       BMI Counseling: Body mass index is 35 35 kg/m²  The BMI is above normal  Nutrition recommendations include reducing portion sizes and decreasing overall calorie intake  Exercise recommendations include exercising 3-5 times per week

## 2019-10-25 ENCOUNTER — CLINICAL SUPPORT (OUTPATIENT)
Dept: FAMILY MEDICINE CLINIC | Facility: CLINIC | Age: 69
End: 2019-10-25
Payer: MEDICARE

## 2019-10-25 DIAGNOSIS — Z11.1 SCREENING FOR TUBERCULOSIS: Primary | ICD-10-CM

## 2019-10-25 PROCEDURE — 86580 TB INTRADERMAL TEST: CPT

## 2019-10-28 LAB
INDURATION: 0 MM
TB SKIN TEST: NEGATIVE

## 2020-01-02 DIAGNOSIS — I10 ESSENTIAL HYPERTENSION: ICD-10-CM

## 2020-01-03 RX ORDER — LISINOPRIL 20 MG/1
30 TABLET ORAL DAILY
Qty: 135 TABLET | Refills: 1 | Status: SHIPPED | OUTPATIENT
Start: 2020-01-03 | End: 2020-06-11 | Stop reason: SDUPTHER

## 2020-03-19 ENCOUNTER — OFFICE VISIT (OUTPATIENT)
Dept: FAMILY MEDICINE CLINIC | Facility: CLINIC | Age: 70
End: 2020-03-19
Payer: MEDICARE

## 2020-03-19 VITALS
OXYGEN SATURATION: 97 % | BODY MASS INDEX: 34.95 KG/M2 | HEART RATE: 64 BPM | DIASTOLIC BLOOD PRESSURE: 80 MMHG | SYSTOLIC BLOOD PRESSURE: 168 MMHG | TEMPERATURE: 98.2 F | WEIGHT: 178 LBS | RESPIRATION RATE: 16 BRPM | HEIGHT: 60 IN

## 2020-03-19 DIAGNOSIS — K21.9 GASTROESOPHAGEAL REFLUX DISEASE WITHOUT ESOPHAGITIS: Primary | ICD-10-CM

## 2020-03-19 DIAGNOSIS — J30.9 ALLERGIC RHINITIS, UNSPECIFIED SEASONALITY, UNSPECIFIED TRIGGER: ICD-10-CM

## 2020-03-19 PROBLEM — H81.10 BPV (BENIGN POSITIONAL VERTIGO), UNSPECIFIED LATERALITY: Status: RESOLVED | Noted: 2019-04-01 | Resolved: 2020-03-19

## 2020-03-19 PROCEDURE — 3079F DIAST BP 80-89 MM HG: CPT | Performed by: PHYSICIAN ASSISTANT

## 2020-03-19 PROCEDURE — 99213 OFFICE O/P EST LOW 20 MIN: CPT | Performed by: PHYSICIAN ASSISTANT

## 2020-03-19 PROCEDURE — 1160F RVW MEDS BY RX/DR IN RCRD: CPT | Performed by: PHYSICIAN ASSISTANT

## 2020-03-19 PROCEDURE — 3077F SYST BP >= 140 MM HG: CPT | Performed by: PHYSICIAN ASSISTANT

## 2020-03-19 PROCEDURE — 3008F BODY MASS INDEX DOCD: CPT | Performed by: PHYSICIAN ASSISTANT

## 2020-03-19 PROCEDURE — 1036F TOBACCO NON-USER: CPT | Performed by: PHYSICIAN ASSISTANT

## 2020-03-19 RX ORDER — FEXOFENADINE HYDROCHLORIDE 60 MG/1
60 TABLET, FILM COATED ORAL 2 TIMES DAILY
Qty: 180 TABLET | Refills: 1 | Status: SHIPPED | OUTPATIENT
Start: 2020-03-19 | End: 2021-09-10 | Stop reason: SDUPTHER

## 2020-03-19 RX ORDER — OMEPRAZOLE 20 MG/1
20 CAPSULE, DELAYED RELEASE ORAL DAILY
Qty: 90 CAPSULE | Refills: 1 | Status: SHIPPED | OUTPATIENT
Start: 2020-03-19 | End: 2020-07-20 | Stop reason: SDUPTHER

## 2020-03-19 NOTE — PROGRESS NOTES
Assessment/Plan:     Diagnoses and all orders for this visit:    Gastroesophageal reflux disease without esophagitis  Comments:  GERD has improved  She will continue her omeprazole 20 mg a day  Follow-up if worsens  Orders:  -     omeprazole (PriLOSEC) 20 mg delayed release capsule; Take 1 capsule (20 mg total) by mouth daily    Allergic rhinitis, unspecified seasonality, unspecified trigger  Comments: Will reorder her Allegra 60 mg for twice daily use  Orders:  -     fexofenadine (ALLEGRA) 60 MG tablet; Take 1 tablet (60 mg total) by mouth 2 (two) times a day          Subjective:      Patient ID: Liza Alejandro is a 79 y o  female  Patient presents for follow-up from Urgent Care  Patient states she was on vacation  She came home and she had right ear pressure and vertigo  She was seen at urgent care and treated with Cipro and meclizine  Patient states she was unable to complete Cipro due to side effects of body aches and headache and upset stomach  Patient states the vertigo has since resolved  Right ear and left ear feel clogged  She has no sore throat she has some postnasal drip that has started  No cough and no fever  Patient also had a flare-up of her GERD  She has been on her omeprazole daily with improvement      The following portions of the patient's history were reviewed and updated as appropriate:   She   Patient Active Problem List    Diagnosis Date Noted    Gastroesophageal reflux disease without esophagitis 03/19/2020    Allergic rhinitis 03/19/2020    SSS (sick sinus syndrome) ([facility] [address] ) 04/08/2019    History of sinus bradycardia 04/01/2019    Pain in both lower legs 04/01/2019    History of bilateral knee replacement 04/01/2019    Pacemaker 03/08/2018    DJD (degenerative joint disease) 10/21/2017    Mixed hyperlipidemia 10/06/2016    Moderate aortic stenosis 10/06/2016    Hypertension 06/07/2012     She  reports that she has quit smoking   She has a 22 50 pack-year smoking history  She has never used smokeless tobacco  She reports that she drinks alcohol  She reports that she does not use drugs  Current Outpatient Medications   Medication Sig Dispense Refill    ALPRAZolam (XANAX) 0 25 mg tablet One tab one hour before plane flight 30 tablet 0    Cholecalciferol (VITAMIN D3) 5000 units CAPS Take by mouth      Cyanocobalamin (VITAMIN B-12 PO) Take by mouth      fexofenadine (ALLEGRA) 60 MG tablet Take 1 tablet (60 mg total) by mouth 2 (two) times a day 180 tablet 1    lidocaine (LIDODERM) 5 % Apply 1 patch topically      lisinopril (ZESTRIL) 20 mg tablet Take 1 5 tablets (30 mg total) by mouth daily 135 tablet 1    meclizine (ANTIVERT) 12 5 MG tablet Take 1 tablet (12 5 mg total) by mouth 3 (three) times a day as needed for dizziness 30 tablet 1    meloxicam (MOBIC) 15 mg tablet Take 1 tablet (15 mg total) by mouth daily 30 tablet 1    mometasone (ELOCON) 0 1 % cream Apply topically daily 45 g 0    Multiple Vitamin (MULTIVITAMINS PO) Take by mouth      omeprazole (PriLOSEC) 20 mg delayed release capsule Take 1 capsule (20 mg total) by mouth daily 90 capsule 1    rosuvastatin (CRESTOR) 5 mg tablet Take 1 tablet (5 mg total) by mouth daily 90 tablet 1     No current facility-administered medications for this visit        Current Outpatient Medications on File Prior to Visit   Medication Sig    ALPRAZolam (XANAX) 0 25 mg tablet One tab one hour before plane flight    Cholecalciferol (VITAMIN D3) 5000 units CAPS Take by mouth    Cyanocobalamin (VITAMIN B-12 PO) Take by mouth    lidocaine (LIDODERM) 5 % Apply 1 patch topically    lisinopril (ZESTRIL) 20 mg tablet Take 1 5 tablets (30 mg total) by mouth daily    meclizine (ANTIVERT) 12 5 MG tablet Take 1 tablet (12 5 mg total) by mouth 3 (three) times a day as needed for dizziness    meloxicam (MOBIC) 15 mg tablet Take 1 tablet (15 mg total) by mouth daily    mometasone (ELOCON) 0 1 % cream Apply topically daily    Multiple Vitamin (MULTIVITAMINS PO) Take by mouth    rosuvastatin (CRESTOR) 5 mg tablet Take 1 tablet (5 mg total) by mouth daily    [DISCONTINUED] fexofenadine (ALLEGRA) 60 MG tablet Take 60 mg by mouth    [DISCONTINUED] loratadine-pseudoephedrine (CLARITIN-D 24-HOUR)  mg per 24 hr tablet Take 1 tablet by mouth daily    [DISCONTINUED] omeprazole (PriLOSEC) 20 mg delayed release capsule Take 1 capsule by mouth daily     No current facility-administered medications on file prior to visit  She is allergic to amoxicillin; atorvastatin; augmentin es-600  [amoxicillin-pot clavulanate]; lisinopril; metoprolol; penicillins; latex; and wound dressings       Review of Systems   Constitutional: Negative for activity change, appetite change, chills, fatigue and fever  HENT: Positive for ear pain  Negative for postnasal drip, rhinorrhea, sinus pressure, sinus pain and sore throat  Respiratory: Negative for cough  Skin: Negative for rash  Neurological: Negative for dizziness  Objective:        Physical Exam   Constitutional: She is oriented to person, place, and time  She appears well-developed and well-nourished  No distress  HENT:   Head: Normocephalic and atraumatic  Right Ear: Tympanic membrane, external ear and ear canal normal    Left Ear: Tympanic membrane, external ear and ear canal normal    Nose: Right sinus exhibits no maxillary sinus tenderness and no frontal sinus tenderness  Left sinus exhibits no maxillary sinus tenderness and no frontal sinus tenderness  Mouth/Throat: Oropharynx is clear and moist  No oropharyngeal exudate or posterior oropharyngeal erythema  Eyes: Pupils are equal, round, and reactive to light  Conjunctivae are normal    Neck: Carotid bruit is not present  Cardiovascular: Normal rate and regular rhythm  Exam reveals no gallop and no friction rub  Murmur heard  Pulmonary/Chest: Effort normal and breath sounds normal  No respiratory distress   She has no wheezes  Abdominal: Soft  Bowel sounds are normal  There is no tenderness  Musculoskeletal: She exhibits no edema  Lymphadenopathy:     She has no cervical adenopathy  Neurological: She is alert and oriented to person, place, and time  Skin: Skin is warm and dry  No rash noted  She is not diaphoretic  No erythema  Psychiatric: She has a normal mood and affect  Her behavior is normal  Judgment and thought content normal    Nursing note and vitals reviewed

## 2020-05-08 ENCOUNTER — OFFICE VISIT (OUTPATIENT)
Dept: FAMILY MEDICINE CLINIC | Facility: CLINIC | Age: 70
End: 2020-05-08
Payer: MEDICARE

## 2020-05-08 VITALS
WEIGHT: 182 LBS | HEART RATE: 75 BPM | BODY MASS INDEX: 35.73 KG/M2 | SYSTOLIC BLOOD PRESSURE: 130 MMHG | HEIGHT: 60 IN | DIASTOLIC BLOOD PRESSURE: 80 MMHG | OXYGEN SATURATION: 98 % | TEMPERATURE: 97.1 F | RESPIRATION RATE: 18 BRPM

## 2020-05-08 DIAGNOSIS — M54.6 ACUTE LEFT-SIDED THORACIC BACK PAIN: Primary | ICD-10-CM

## 2020-05-08 DIAGNOSIS — L30.9 ECZEMA, UNSPECIFIED TYPE: ICD-10-CM

## 2020-05-08 PROBLEM — I73.9 CLAUDICATION (HCC): Status: ACTIVE | Noted: 2020-05-08

## 2020-05-08 PROCEDURE — 3008F BODY MASS INDEX DOCD: CPT | Performed by: INTERNAL MEDICINE

## 2020-05-08 PROCEDURE — 3079F DIAST BP 80-89 MM HG: CPT | Performed by: INTERNAL MEDICINE

## 2020-05-08 PROCEDURE — 99214 OFFICE O/P EST MOD 30 MIN: CPT | Performed by: INTERNAL MEDICINE

## 2020-05-08 PROCEDURE — 1036F TOBACCO NON-USER: CPT | Performed by: INTERNAL MEDICINE

## 2020-05-08 PROCEDURE — 3075F SYST BP GE 130 - 139MM HG: CPT | Performed by: INTERNAL MEDICINE

## 2020-05-08 PROCEDURE — 1160F RVW MEDS BY RX/DR IN RCRD: CPT | Performed by: INTERNAL MEDICINE

## 2020-05-08 RX ORDER — MOMETASONE FUROATE 1 MG/G
CREAM TOPICAL DAILY
Qty: 45 G | Refills: 0 | Status: SHIPPED | OUTPATIENT
Start: 2020-05-08 | End: 2021-04-08 | Stop reason: SDUPTHER

## 2020-05-08 RX ORDER — CYCLOBENZAPRINE HCL 10 MG
10 TABLET ORAL 3 TIMES DAILY PRN
Qty: 15 TABLET | Refills: 0 | Status: SHIPPED | OUTPATIENT
Start: 2020-05-08 | End: 2020-10-09

## 2020-05-08 RX ORDER — MELOXICAM 15 MG/1
15 TABLET ORAL DAILY
Qty: 30 TABLET | Refills: 1 | Status: SHIPPED | OUTPATIENT
Start: 2020-05-08 | End: 2021-09-28

## 2020-06-11 DIAGNOSIS — I10 ESSENTIAL HYPERTENSION: ICD-10-CM

## 2020-06-12 RX ORDER — LISINOPRIL 20 MG/1
30 TABLET ORAL DAILY
Qty: 135 TABLET | Refills: 1 | Status: SHIPPED | OUTPATIENT
Start: 2020-06-12 | End: 2021-02-11 | Stop reason: SDUPTHER

## 2020-07-20 ENCOUNTER — OFFICE VISIT (OUTPATIENT)
Dept: FAMILY MEDICINE CLINIC | Facility: CLINIC | Age: 70
End: 2020-07-20
Payer: MEDICARE

## 2020-07-20 ENCOUNTER — APPOINTMENT (OUTPATIENT)
Dept: RADIOLOGY | Facility: MEDICAL CENTER | Age: 70
End: 2020-07-20
Payer: MEDICARE

## 2020-07-20 VITALS
HEART RATE: 72 BPM | BODY MASS INDEX: 35.14 KG/M2 | WEIGHT: 179 LBS | OXYGEN SATURATION: 98 % | HEIGHT: 60 IN | TEMPERATURE: 97.3 F | SYSTOLIC BLOOD PRESSURE: 144 MMHG | DIASTOLIC BLOOD PRESSURE: 88 MMHG

## 2020-07-20 DIAGNOSIS — M54.50 LOW BACK PAIN WITHOUT SCIATICA, UNSPECIFIED BACK PAIN LATERALITY, UNSPECIFIED CHRONICITY: ICD-10-CM

## 2020-07-20 DIAGNOSIS — K21.9 GASTROESOPHAGEAL REFLUX DISEASE WITHOUT ESOPHAGITIS: ICD-10-CM

## 2020-07-20 DIAGNOSIS — M54.50 LOW BACK PAIN, UNSPECIFIED BACK PAIN LATERALITY, UNSPECIFIED CHRONICITY, UNSPECIFIED WHETHER SCIATICA PRESENT: ICD-10-CM

## 2020-07-20 DIAGNOSIS — M54.50 LOW BACK PAIN WITHOUT SCIATICA, UNSPECIFIED BACK PAIN LATERALITY, UNSPECIFIED CHRONICITY: Primary | ICD-10-CM

## 2020-07-20 PROCEDURE — 3079F DIAST BP 80-89 MM HG: CPT | Performed by: INTERNAL MEDICINE

## 2020-07-20 PROCEDURE — 99214 OFFICE O/P EST MOD 30 MIN: CPT | Performed by: INTERNAL MEDICINE

## 2020-07-20 PROCEDURE — 3008F BODY MASS INDEX DOCD: CPT | Performed by: INTERNAL MEDICINE

## 2020-07-20 PROCEDURE — 1036F TOBACCO NON-USER: CPT | Performed by: INTERNAL MEDICINE

## 2020-07-20 PROCEDURE — 3077F SYST BP >= 140 MM HG: CPT | Performed by: INTERNAL MEDICINE

## 2020-07-20 PROCEDURE — 72110 X-RAY EXAM L-2 SPINE 4/>VWS: CPT

## 2020-07-20 PROCEDURE — 1160F RVW MEDS BY RX/DR IN RCRD: CPT | Performed by: INTERNAL MEDICINE

## 2020-07-20 RX ORDER — LIDOCAINE 50 MG/G
1 PATCH TOPICAL DAILY
Qty: 90 PATCH | Refills: 1 | Status: SHIPPED | OUTPATIENT
Start: 2020-07-20

## 2020-07-20 RX ORDER — CYCLOBENZAPRINE HCL 10 MG
10 TABLET ORAL
Qty: 30 TABLET | Refills: 1 | Status: SHIPPED | OUTPATIENT
Start: 2020-07-20 | End: 2020-12-23 | Stop reason: SDUPTHER

## 2020-07-20 RX ORDER — OMEPRAZOLE 20 MG/1
20 CAPSULE, DELAYED RELEASE ORAL DAILY
Qty: 90 CAPSULE | Refills: 1 | Status: SHIPPED | OUTPATIENT
Start: 2020-07-20 | End: 2021-02-11 | Stop reason: SDUPTHER

## 2020-07-20 NOTE — PROGRESS NOTES
BMI Counseling: Body mass index is 34 96 kg/m²  The BMI is above normal  Nutrition recommendations include decreasing portion sizes and limiting drinks that contain sugar  Exercise recommendations include exercising 3-5 times per week  No pharmacotherapy was ordered  Patient referred to PCP due to patient being overweight  Assessment/Plan:         Diagnoses and all orders for this visit:    Low back pain without sciatica, unspecified back pain laterality, unspecified chronicity  -     cyclobenzaprine (FLEXERIL) 10 mg tablet; Take 1 tablet (10 mg total) by mouth daily at bedtime  -     lidocaine (LIDODERM) 5 %; Apply 1 patch topically daily  -     XR spine lumbar minimum 4 views non injury; Future    Gastroesophageal reflux disease without esophagitis  Comments:  GERD has improved  She will continue her omeprazole 20 mg a day  Follow-up if worsens  Orders:  -     omeprazole (PriLOSEC) 20 mg delayed release capsule; Take 1 capsule (20 mg total) by mouth daily    Low back pain, unspecified back pain laterality, unspecified chronicity, unspecified whether sciatica present          Subjective:      Patient ID: Marc Arceo is a 79 y o  female  Pt was doing a lot of yeard work and repetitive bending   +lifting 40 lb bag of soil  Now with low back pain  No radiation      The following portions of the patient's history were reviewed and updated as appropriate: She  has a past medical history of Anxiety, Chronic back pain, Degenerative joint disease, Depression, Dyslipidemia, Hiatal hernia, Hyperglycemia, Lupus anticoagulant positive, Osteoarthritis, Seasonal allergies, Sick sinus syndrome (HCC), Sinus bradycardia, Tachycardia-bradycardia syndrome ([address] ), Total knee replacement status, and Vitamin D deficiency    She   Patient Active Problem List    Diagnosis Date Noted    Claudication [facility]) 05/08/2020    Gastroesophageal reflux disease without esophagitis 03/19/2020    Allergic rhinitis 03/19/2020    SSS (sick sinus syndrome) ([facility] [address] ) 04/08/2019    History of sinus bradycardia 04/01/2019    Pain in both lower legs 04/01/2019    History of bilateral knee replacement 04/01/2019    Pacemaker 03/08/2018    DJD (degenerative joint disease) 10/21/2017    Mixed hyperlipidemia 10/06/2016    Moderate aortic stenosis 10/06/2016    Hypertension 06/07/2012     She  has a past surgical history that includes Breast lumpectomy (Bilateral); Knee surgery; and Tubal ligation  Her family history includes Allergies in her family; Diabetes type II in her brother, father, and sister; Hypertension in her family; Stroke in her father and mother  She  reports that she has quit smoking  She has a 22 50 pack-year smoking history  She has never used smokeless tobacco  She reports that she drinks alcohol  She reports that she does not use drugs    Current Outpatient Medications   Medication Sig Dispense Refill    ALPRAZolam (XANAX) 0 25 mg tablet One tab one hour before plane flight 30 tablet 0    Cholecalciferol (VITAMIN D3) 5000 units CAPS Take by mouth      Cyanocobalamin (VITAMIN B-12 PO) Take by mouth      cyclobenzaprine (FLEXERIL) 10 mg tablet Take 1 tablet (10 mg total) by mouth 3 (three) times a day as needed for muscle spasms 15 tablet 0    fexofenadine (ALLEGRA) 60 MG tablet Take 1 tablet (60 mg total) by mouth 2 (two) times a day 180 tablet 1    lidocaine (LIDODERM) 5 % Apply 1 patch topically daily 90 patch 1    lisinopril (ZESTRIL) 20 mg tablet Take 1 5 tablets (30 mg total) by mouth daily 135 tablet 1    meclizine (ANTIVERT) 12 5 MG tablet Take 1 tablet (12 5 mg total) by mouth 3 (three) times a day as needed for dizziness 30 tablet 1    meloxicam (MOBIC) 15 mg tablet Take 1 tablet (15 mg total) by mouth daily 30 tablet 1    mometasone (ELOCON) 0 1 % cream Apply topically daily 45 g 0    Multiple Vitamin (MULTIVITAMINS PO) Take by mouth      omeprazole (PriLOSEC) 20 mg delayed release capsule Take 1 capsule (20 mg total) by mouth daily 90 capsule 1    cyclobenzaprine (FLEXERIL) 10 mg tablet Take 1 tablet (10 mg total) by mouth daily at bedtime 30 tablet 1     No current facility-administered medications for this visit  Current Outpatient Medications on File Prior to Visit   Medication Sig    ALPRAZolam (XANAX) 0 25 mg tablet One tab one hour before plane flight    Cholecalciferol (VITAMIN D3) 5000 units CAPS Take by mouth    Cyanocobalamin (VITAMIN B-12 PO) Take by mouth    cyclobenzaprine (FLEXERIL) 10 mg tablet Take 1 tablet (10 mg total) by mouth 3 (three) times a day as needed for muscle spasms    fexofenadine (ALLEGRA) 60 MG tablet Take 1 tablet (60 mg total) by mouth 2 (two) times a day    lisinopril (ZESTRIL) 20 mg tablet Take 1 5 tablets (30 mg total) by mouth daily    meclizine (ANTIVERT) 12 5 MG tablet Take 1 tablet (12 5 mg total) by mouth 3 (three) times a day as needed for dizziness    meloxicam (MOBIC) 15 mg tablet Take 1 tablet (15 mg total) by mouth daily    mometasone (ELOCON) 0 1 % cream Apply topically daily    Multiple Vitamin (MULTIVITAMINS PO) Take by mouth     No current facility-administered medications on file prior to visit  She is allergic to amoxicillin; atorvastatin; augmentin es-600  [amoxicillin-pot clavulanate]; lisinopril; metoprolol; penicillins; latex; and wound dressings       Review of Systems   Constitutional: Negative  HENT: Negative  Respiratory: Negative  Cardiovascular: Negative  Objective:      /88 (BP Location: Left arm, Patient Position: Sitting, Cuff Size: Standard)   Pulse 72   Temp (!) 97 3 °F (36 3 °C)   Ht 5' (1 524 m)   Wt 81 2 kg (179 lb)   SpO2 98%   BMI 34 96 kg/m²          Physical Exam   Constitutional: She appears well-developed and well-nourished  No distress  HENT:   Head: Normocephalic and atraumatic     Right Ear: External ear normal    Left Ear: External ear normal    Nose: Nose normal    Mouth/Throat: Oropharynx is clear and moist  No oropharyngeal exudate  Neck: Normal range of motion  Neck supple  No thyromegaly present  Cardiovascular: Normal rate, regular rhythm, normal heart sounds and intact distal pulses  Exam reveals no gallop and no friction rub  No murmur heard  Pulmonary/Chest: Effort normal and breath sounds normal  No respiratory distress  She has no wheezes  Lymphadenopathy:     She has no cervical adenopathy  Skin: She is not diaphoretic

## 2020-10-09 ENCOUNTER — OFFICE VISIT (OUTPATIENT)
Dept: FAMILY MEDICINE CLINIC | Facility: CLINIC | Age: 70
End: 2020-10-09
Payer: MEDICARE

## 2020-10-09 VITALS
HEART RATE: 62 BPM | WEIGHT: 187 LBS | OXYGEN SATURATION: 99 % | SYSTOLIC BLOOD PRESSURE: 130 MMHG | HEIGHT: 60 IN | TEMPERATURE: 97.6 F | BODY MASS INDEX: 36.71 KG/M2 | RESPIRATION RATE: 16 BRPM | DIASTOLIC BLOOD PRESSURE: 74 MMHG

## 2020-10-09 DIAGNOSIS — E66.9 CLASS 2 OBESITY WITH BODY MASS INDEX (BMI) OF 36.0 TO 36.9 IN ADULT, UNSPECIFIED OBESITY TYPE, UNSPECIFIED WHETHER SERIOUS COMORBIDITY PRESENT: ICD-10-CM

## 2020-10-09 DIAGNOSIS — R53.83 FATIGUE, UNSPECIFIED TYPE: ICD-10-CM

## 2020-10-09 DIAGNOSIS — R73.9 HYPERGLYCEMIA: ICD-10-CM

## 2020-10-09 DIAGNOSIS — Z12.31 ENCOUNTER FOR SCREENING MAMMOGRAM FOR MALIGNANT NEOPLASM OF BREAST: ICD-10-CM

## 2020-10-09 DIAGNOSIS — Z00.00 MEDICARE ANNUAL WELLNESS VISIT, SUBSEQUENT: Primary | ICD-10-CM

## 2020-10-09 PROCEDURE — G0438 PPPS, INITIAL VISIT: HCPCS | Performed by: INTERNAL MEDICINE

## 2020-10-09 RX ORDER — RIBOFLAVIN (VITAMIN B2) 100 MG
100 TABLET ORAL DAILY
COMMUNITY

## 2020-10-12 ENCOUNTER — TELEPHONE (OUTPATIENT)
Dept: FAMILY MEDICINE CLINIC | Facility: CLINIC | Age: 70
End: 2020-10-12

## 2020-11-18 ENCOUNTER — LAB (OUTPATIENT)
Dept: LAB | Facility: CLINIC | Age: 70
End: 2020-11-18
Payer: MEDICARE

## 2020-11-18 DIAGNOSIS — R73.9 HYPERGLYCEMIA: ICD-10-CM

## 2020-11-18 DIAGNOSIS — R53.83 FATIGUE, UNSPECIFIED TYPE: ICD-10-CM

## 2020-11-18 DIAGNOSIS — E66.9 CLASS 2 OBESITY WITH BODY MASS INDEX (BMI) OF 36.0 TO 36.9 IN ADULT, UNSPECIFIED OBESITY TYPE, UNSPECIFIED WHETHER SERIOUS COMORBIDITY PRESENT: ICD-10-CM

## 2020-11-18 LAB
ALBUMIN SERPL BCP-MCNC: 4.1 G/DL (ref 3.5–5)
ALP SERPL-CCNC: 81 U/L (ref 46–116)
ALT SERPL W P-5'-P-CCNC: 23 U/L (ref 12–78)
ANION GAP SERPL CALCULATED.3IONS-SCNC: 6 MMOL/L (ref 4–13)
AST SERPL W P-5'-P-CCNC: 15 U/L (ref 5–45)
BILIRUB SERPL-MCNC: 0.58 MG/DL (ref 0.2–1)
BUN SERPL-MCNC: 12 MG/DL (ref 5–25)
CALCIUM SERPL-MCNC: 9.7 MG/DL (ref 8.3–10.1)
CHLORIDE SERPL-SCNC: 109 MMOL/L (ref 100–108)
CHOLEST SERPL-MCNC: 236 MG/DL (ref 50–200)
CO2 SERPL-SCNC: 29 MMOL/L (ref 21–32)
CREAT SERPL-MCNC: 0.76 MG/DL (ref 0.6–1.3)
ERYTHROCYTE [DISTWIDTH] IN BLOOD BY AUTOMATED COUNT: 11.9 % (ref 11.6–15.1)
EST. AVERAGE GLUCOSE BLD GHB EST-MCNC: 117 MG/DL
GFR SERPL CREATININE-BSD FRML MDRD: 80 ML/MIN/1.73SQ M
GLUCOSE P FAST SERPL-MCNC: 98 MG/DL (ref 65–99)
HBA1C MFR BLD: 5.7 %
HCT VFR BLD AUTO: 42.2 % (ref 34.8–46.1)
HDLC SERPL-MCNC: 52 MG/DL
HGB BLD-MCNC: 13.7 G/DL (ref 11.5–15.4)
LDLC SERPL CALC-MCNC: 137 MG/DL (ref 0–100)
MCH RBC QN AUTO: 31.1 PG (ref 26.8–34.3)
MCHC RBC AUTO-ENTMCNC: 32.5 G/DL (ref 31.4–37.4)
MCV RBC AUTO: 96 FL (ref 82–98)
NONHDLC SERPL-MCNC: 184 MG/DL
PLATELET # BLD AUTO: 224 THOUSANDS/UL (ref 149–390)
PMV BLD AUTO: 12.4 FL (ref 8.9–12.7)
POTASSIUM SERPL-SCNC: 4.2 MMOL/L (ref 3.5–5.3)
PROT SERPL-MCNC: 7.5 G/DL (ref 6.4–8.2)
RBC # BLD AUTO: 4.41 MILLION/UL (ref 3.81–5.12)
SODIUM SERPL-SCNC: 144 MMOL/L (ref 136–145)
TRIGL SERPL-MCNC: 234 MG/DL
TSH SERPL DL<=0.05 MIU/L-ACNC: 3.35 UIU/ML (ref 0.36–3.74)
WBC # BLD AUTO: 5.72 THOUSAND/UL (ref 4.31–10.16)

## 2020-11-18 PROCEDURE — 85027 COMPLETE CBC AUTOMATED: CPT

## 2020-11-18 PROCEDURE — 80061 LIPID PANEL: CPT

## 2020-11-18 PROCEDURE — 36415 COLL VENOUS BLD VENIPUNCTURE: CPT

## 2020-11-18 PROCEDURE — 83036 HEMOGLOBIN GLYCOSYLATED A1C: CPT

## 2020-11-18 PROCEDURE — 84443 ASSAY THYROID STIM HORMONE: CPT

## 2020-11-18 PROCEDURE — 80053 COMPREHEN METABOLIC PANEL: CPT

## 2020-11-19 ENCOUNTER — DOCUMENTATION (OUTPATIENT)
Dept: FAMILY MEDICINE CLINIC | Facility: CLINIC | Age: 70
End: 2020-11-19

## 2020-11-25 ENCOUNTER — TRANSITIONAL CARE MANAGEMENT (OUTPATIENT)
Dept: FAMILY MEDICINE CLINIC | Facility: CLINIC | Age: 70
End: 2020-11-25

## 2020-11-30 ENCOUNTER — OFFICE VISIT (OUTPATIENT)
Dept: FAMILY MEDICINE CLINIC | Facility: CLINIC | Age: 70
End: 2020-11-30
Payer: MEDICARE

## 2020-11-30 VITALS
TEMPERATURE: 97.6 F | HEIGHT: 60 IN | OXYGEN SATURATION: 98 % | DIASTOLIC BLOOD PRESSURE: 82 MMHG | HEART RATE: 63 BPM | BODY MASS INDEX: 35.73 KG/M2 | RESPIRATION RATE: 16 BRPM | SYSTOLIC BLOOD PRESSURE: 166 MMHG | WEIGHT: 182 LBS

## 2020-11-30 DIAGNOSIS — I10 ESSENTIAL HYPERTENSION: ICD-10-CM

## 2020-11-30 DIAGNOSIS — E66.01 OBESITY, MORBID (HCC): ICD-10-CM

## 2020-11-30 DIAGNOSIS — D68.62 LUPUS ANTICOAGULANT DISORDER (HCC): ICD-10-CM

## 2020-11-30 DIAGNOSIS — I65.23 BILATERAL CAROTID ARTERY STENOSIS: ICD-10-CM

## 2020-11-30 DIAGNOSIS — I49.5 SSS (SICK SINUS SYNDROME) (HCC): ICD-10-CM

## 2020-11-30 DIAGNOSIS — R76.0 LUPUS ANTICOAGULANT POSITIVE: ICD-10-CM

## 2020-11-30 DIAGNOSIS — I63.9 CEREBROVASCULAR ACCIDENT (CVA), UNSPECIFIED MECHANISM (HCC): Primary | ICD-10-CM

## 2020-11-30 DIAGNOSIS — I35.0 MODERATE AORTIC STENOSIS: ICD-10-CM

## 2020-11-30 DIAGNOSIS — E78.2 MIXED HYPERLIPIDEMIA: ICD-10-CM

## 2020-11-30 PROBLEM — M32.9 LUPUS (HCC): Status: ACTIVE | Noted: 2020-11-30

## 2020-11-30 PROCEDURE — 99496 TRANSJ CARE MGMT HIGH F2F 7D: CPT | Performed by: INTERNAL MEDICINE

## 2020-11-30 RX ORDER — PRAVASTATIN SODIUM 10 MG
10 TABLET ORAL DAILY
COMMUNITY
Start: 2020-11-24 | End: 2020-12-04

## 2020-11-30 RX ORDER — ROSUVASTATIN CALCIUM 5 MG/1
5 TABLET, COATED ORAL DAILY
Qty: 90 TABLET | Refills: 1 | Status: SHIPPED | OUTPATIENT
Start: 2020-11-30 | End: 2021-01-05

## 2020-11-30 RX ORDER — CLOPIDOGREL BISULFATE 75 MG/1
75 TABLET ORAL DAILY
COMMUNITY
Start: 2020-11-24 | End: 2021-01-05

## 2020-11-30 RX ORDER — AMLODIPINE BESYLATE 2.5 MG/1
2.5 TABLET ORAL DAILY
Qty: 90 TABLET | Refills: 1 | Status: SHIPPED | OUTPATIENT
Start: 2020-11-30 | End: 2021-02-11 | Stop reason: SDUPTHER

## 2020-11-30 RX ORDER — CLOPIDOGREL BISULFATE 75 MG/1
75 TABLET ORAL DAILY
Qty: 11 TABLET | Refills: 1 | Status: SHIPPED | OUTPATIENT
Start: 2020-11-30 | End: 2021-01-05

## 2020-12-04 ENCOUNTER — DOCUMENTATION (OUTPATIENT)
Dept: FAMILY MEDICINE CLINIC | Facility: CLINIC | Age: 70
End: 2020-12-04

## 2020-12-04 NOTE — PROGRESS NOTES
Patient has documented Hx of colon polyps  She is over due for a colonoscopy  Please call and inquire if she is ok with us entering the order for the colonoscopy? Verify who did her last procedure

## 2020-12-21 ENCOUNTER — TELEPHONE (OUTPATIENT)
Dept: FAMILY MEDICINE CLINIC | Facility: CLINIC | Age: 70
End: 2020-12-21

## 2020-12-23 DIAGNOSIS — M54.50 LOW BACK PAIN WITHOUT SCIATICA, UNSPECIFIED BACK PAIN LATERALITY, UNSPECIFIED CHRONICITY: ICD-10-CM

## 2020-12-23 RX ORDER — CYCLOBENZAPRINE HCL 10 MG
10 TABLET ORAL
Qty: 30 TABLET | Refills: 1 | Status: SHIPPED | OUTPATIENT
Start: 2020-12-23

## 2021-01-05 ENCOUNTER — OFFICE VISIT (OUTPATIENT)
Dept: FAMILY MEDICINE CLINIC | Facility: CLINIC | Age: 71
End: 2021-01-05
Payer: MEDICARE

## 2021-01-05 VITALS
OXYGEN SATURATION: 98 % | RESPIRATION RATE: 16 BRPM | SYSTOLIC BLOOD PRESSURE: 160 MMHG | WEIGHT: 186 LBS | HEIGHT: 60 IN | TEMPERATURE: 97.1 F | BODY MASS INDEX: 36.52 KG/M2 | DIASTOLIC BLOOD PRESSURE: 82 MMHG | HEART RATE: 64 BPM

## 2021-01-05 DIAGNOSIS — I35.0 AORTIC VALVE STENOSIS, ETIOLOGY OF CARDIAC VALVE DISEASE UNSPECIFIED: Primary | ICD-10-CM

## 2021-01-05 DIAGNOSIS — I49.5 SSS (SICK SINUS SYNDROME) (HCC): ICD-10-CM

## 2021-01-05 DIAGNOSIS — E78.5 DYSLIPIDEMIA: ICD-10-CM

## 2021-01-05 DIAGNOSIS — I63.9 CEREBROVASCULAR ACCIDENT (CVA), UNSPECIFIED MECHANISM (HCC): ICD-10-CM

## 2021-01-05 DIAGNOSIS — E66.01 OBESITY, MORBID (HCC): ICD-10-CM

## 2021-01-05 DIAGNOSIS — I10 ESSENTIAL HYPERTENSION: ICD-10-CM

## 2021-01-05 PROCEDURE — 99214 OFFICE O/P EST MOD 30 MIN: CPT | Performed by: INTERNAL MEDICINE

## 2021-01-05 NOTE — PROGRESS NOTES
BMI Counseling: Body mass index is 36 02 kg/m²  The BMI is above normal  Nutrition recommendations include decreasing portion sizes and limiting drinks that contain sugar  Exercise recommendations include exercising 3-5 times per week  No pharmacotherapy was ordered  Patient referred to PCP due to patient being overweight  Assessment/Plan:         Diagnoses and all orders for this visit:    Aortic valve stenosis, etiology of cardiac valve disease unspecified  Comments:  severe  to see tavr team    Essential hypertension  Comments:  she relates only using norvasc 2 5mg prn  asked her to take daily she agrees  SSS (sick sinus syndrome) ([facility])  Comments:  pacer    Cerebrovascular accident (CVA), unspecified mechanism ([facility] )  Comments:  now on asa   can't lucio statin  will call card about PCSK9 inhibitor    Dyslipidemia  Comments:  intol of statins and to call card today ? PCSK9 inhibitor    Obesity, morbid ([place] [address] )  Comments:  trying to reduce          Subjective:      Patient ID: Melanie Ferguson is a 79 y o  female  Pt stopped her crestor on Sunday and all achiness resolved  Discussed she must call card  They discussed if she can't tolerate they would change her med to PCSK9 inhibitor  She also saw neuro  Also saw heme for lupus anticoag  She is called antiphospholipid syndrome  Denies amarosa fugax  Denies focal lat  Told her not txing her lipids which must be addressed  She will call card today  She relates not taking bp at home and is only using norvasc 2 5mg prn    Told her she needs to take it daily      The following portions of the patient's history were reviewed and updated as appropriate: She  has a past medical history of Anxiety, Chronic back pain, Degenerative joint disease, Depression, Dyslipidemia, Hiatal hernia, Hyperglycemia, Lupus anticoagulant positive, Osteoarthritis, Seasonal allergies, Sick sinus syndrome ([place] [address] ), Sinus bradycardia, Tachycardia-bradycardia syndrome ([facility] ), Total knee replacement status, and Vitamin D deficiency  She   Patient Active Problem List    Diagnosis Date Noted    Dyslipidemia     Lupus ([facility]) 11/30/2020    Obesity, morbid ([facility]) 11/30/2020    Lupus anticoagulant disorder ([facility]) 11/30/2020    Lupus anticoagulant positive     Claudication ([facility]) 05/08/2020    Gastroesophageal reflux disease without esophagitis 03/19/2020    Allergic rhinitis 03/19/2020    SSS (sick sinus syndrome) ([place]) 04/08/2019    History of sinus bradycardia 04/01/2019    Pain in both lower legs 04/01/2019    History of bilateral knee replacement 04/01/2019    Pacemaker 03/08/2018    DJD (degenerative joint disease) 10/21/2017    Mixed hyperlipidemia 10/06/2016    Severe aortic stenosis 10/06/2016    Hypertension 06/07/2012     She  has a past surgical history that includes Breast lumpectomy (Bilateral); Knee surgery; and Tubal ligation  Her family history includes Allergies in her family; Diabetes type II in her brother, father, and sister; Hypertension in her family; Stroke in her father and mother  She  reports that she has quit smoking  She has a 22 50 pack-year smoking history  She has never used smokeless tobacco  She reports current alcohol use  She reports that she does not use drugs    Current Outpatient Medications   Medication Sig Dispense Refill    ALPRAZolam (XANAX) 0 25 mg tablet One tab one hour before plane flight 30 tablet 0    amLODIPine (NORVASC) 2 5 mg tablet Take 1 tablet (2 5 mg total) by mouth daily 90 tablet 1    Ascorbic Acid (vitamin C) 100 MG tablet Take 100 mg by mouth daily      Cholecalciferol (VITAMIN D3) 5000 units CAPS Take by mouth      Cyanocobalamin (VITAMIN B-12 PO) Take by mouth      cyclobenzaprine (FLEXERIL) 10 mg tablet Take 1 tablet (10 mg total) by mouth daily at bedtime 30 tablet 1    fexofenadine (ALLEGRA) 60 MG tablet Take 1 tablet (60 mg total) by mouth 2 (two) times a day 180 tablet 1    lidocaine (LIDODERM) 5 % Apply 1 patch topically daily 90 patch 1    lisinopril (ZESTRIL) 20 mg tablet Take 1 5 tablets (30 mg total) by mouth daily 135 tablet 1    meclizine (ANTIVERT) 12 5 MG tablet Take 1 tablet (12 5 mg total) by mouth 3 (three) times a day as needed for dizziness 30 tablet 1    meloxicam (MOBIC) 15 mg tablet Take 1 tablet (15 mg total) by mouth daily 30 tablet 1    mometasone (ELOCON) 0 1 % cream Apply topically daily 45 g 0    Multiple Vitamin (MULTIVITAMINS PO) Take by mouth      omeprazole (PriLOSEC) 20 mg delayed release capsule Take 1 capsule (20 mg total) by mouth daily 90 capsule 1     No current facility-administered medications for this visit  Current Outpatient Medications on File Prior to Visit   Medication Sig    ALPRAZolam (XANAX) 0 25 mg tablet One tab one hour before plane flight    amLODIPine (NORVASC) 2 5 mg tablet Take 1 tablet (2 5 mg total) by mouth daily    Ascorbic Acid (vitamin C) 100 MG tablet Take 100 mg by mouth daily    Cholecalciferol (VITAMIN D3) 5000 units CAPS Take by mouth    Cyanocobalamin (VITAMIN B-12 PO) Take by mouth    cyclobenzaprine (FLEXERIL) 10 mg tablet Take 1 tablet (10 mg total) by mouth daily at bedtime    fexofenadine (ALLEGRA) 60 MG tablet Take 1 tablet (60 mg total) by mouth 2 (two) times a day    lidocaine (LIDODERM) 5 % Apply 1 patch topically daily    lisinopril (ZESTRIL) 20 mg tablet Take 1 5 tablets (30 mg total) by mouth daily    meclizine (ANTIVERT) 12 5 MG tablet Take 1 tablet (12 5 mg total) by mouth 3 (three) times a day as needed for dizziness    meloxicam (MOBIC) 15 mg tablet Take 1 tablet (15 mg total) by mouth daily    mometasone (ELOCON) 0 1 % cream Apply topically daily    Multiple Vitamin (MULTIVITAMINS PO) Take by mouth    omeprazole (PriLOSEC) 20 mg delayed release capsule Take 1 capsule (20 mg total) by mouth daily     No current facility-administered medications on file prior to visit        She is allergic to amoxicillin; atorvastatin; augmentin es-600  [amoxicillin-pot clavulanate]; lisinopril; metoprolol; penicillins; latex; and wound dressings       Review of Systems   Constitutional: Negative  HENT: Negative  Eyes: Negative for visual disturbance  Respiratory: Negative  Cardiovascular: Negative  Neurological: Negative for facial asymmetry and weakness  Objective:      /82 (BP Location: Right arm, Patient Position: Sitting, Cuff Size: Large)   Pulse 64   Temp (!) 97 1 °F (36 2 °C) (Temporal)   Resp 16   Ht 5' 0 25" (1 53 m)   Wt 84 4 kg (186 lb)   SpO2 98%   BMI 36 02 kg/m²          Physical Exam  Constitutional:       Appearance: Normal appearance  HENT:      Head: Normocephalic and atraumatic  Right Ear: Tympanic membrane, ear canal and external ear normal       Left Ear: Tympanic membrane, ear canal and external ear normal    Cardiovascular:      Rate and Rhythm: Normal rate and regular rhythm  Pulmonary:      Effort: Pulmonary effort is normal       Breath sounds: Normal breath sounds  Neurological:      Mental Status: She is alert

## 2021-02-11 ENCOUNTER — OFFICE VISIT (OUTPATIENT)
Dept: FAMILY MEDICINE CLINIC | Facility: CLINIC | Age: 71
End: 2021-02-11
Payer: MEDICARE

## 2021-02-11 VITALS
OXYGEN SATURATION: 98 % | SYSTOLIC BLOOD PRESSURE: 118 MMHG | DIASTOLIC BLOOD PRESSURE: 64 MMHG | BODY MASS INDEX: 35.93 KG/M2 | HEIGHT: 60 IN | HEART RATE: 61 BPM | WEIGHT: 183 LBS | RESPIRATION RATE: 16 BRPM | TEMPERATURE: 96.9 F

## 2021-02-11 DIAGNOSIS — I10 ESSENTIAL HYPERTENSION: Primary | ICD-10-CM

## 2021-02-11 DIAGNOSIS — D68.62 LUPUS ANTICOAGULANT DISORDER (HCC): ICD-10-CM

## 2021-02-11 DIAGNOSIS — I10 ESSENTIAL HYPERTENSION: ICD-10-CM

## 2021-02-11 DIAGNOSIS — I49.5 SSS (SICK SINUS SYNDROME) (HCC): ICD-10-CM

## 2021-02-11 DIAGNOSIS — I73.9 CLAUDICATION (HCC): ICD-10-CM

## 2021-02-11 DIAGNOSIS — K21.9 GASTROESOPHAGEAL REFLUX DISEASE WITHOUT ESOPHAGITIS: ICD-10-CM

## 2021-02-11 DIAGNOSIS — E78.5 DYSLIPIDEMIA: ICD-10-CM

## 2021-02-11 PROCEDURE — 99214 OFFICE O/P EST MOD 30 MIN: CPT | Performed by: INTERNAL MEDICINE

## 2021-02-11 RX ORDER — ROSUVASTATIN CALCIUM 5 MG/1
5 TABLET, COATED ORAL EVERY OTHER DAY
COMMUNITY
End: 2021-02-11

## 2021-02-11 RX ORDER — ROSUVASTATIN CALCIUM 10 MG/1
10 TABLET, COATED ORAL EVERY OTHER DAY
COMMUNITY
End: 2021-02-11

## 2021-02-11 RX ORDER — PRAVASTATIN SODIUM 10 MG
10 TABLET ORAL
Qty: 90 TABLET | Refills: 1 | Status: SHIPPED | OUTPATIENT
Start: 2021-02-11 | End: 2021-07-06 | Stop reason: SDUPTHER

## 2021-02-11 RX ORDER — LISINOPRIL 20 MG/1
20 TABLET ORAL DAILY
Qty: 90 TABLET | Refills: 1 | Status: SHIPPED | OUTPATIENT
Start: 2021-02-11 | End: 2021-07-06 | Stop reason: SDUPTHER

## 2021-02-11 RX ORDER — OMEPRAZOLE 20 MG/1
20 CAPSULE, DELAYED RELEASE ORAL DAILY
Qty: 90 CAPSULE | Refills: 1 | Status: SHIPPED | OUTPATIENT
Start: 2021-02-11 | End: 2021-09-28 | Stop reason: SDUPTHER

## 2021-02-11 RX ORDER — AMLODIPINE BESYLATE 2.5 MG/1
2.5 TABLET ORAL DAILY
Qty: 90 TABLET | Refills: 1 | Status: SHIPPED | OUTPATIENT
Start: 2021-02-11 | End: 2021-07-06 | Stop reason: SDUPTHER

## 2021-02-11 NOTE — PROGRESS NOTES
Assessment/Plan:         Diagnoses and all orders for this visit:    Essential hypertension  Comments:  tolerating  Orders:  -     amLODIPine (NORVASC) 2 5 mg tablet; Take 1 tablet (2 5 mg total) by mouth daily  -     lisinopril (ZESTRIL) 20 mg tablet; Take 1 tablet (20 mg total) by mouth daily She does not cough unless >20mg    Essential hypertension  -     amLODIPine (NORVASC) 2 5 mg tablet; Take 1 tablet (2 5 mg total) by mouth daily  -     lisinopril (ZESTRIL) 20 mg tablet; Take 1 tablet (20 mg total) by mouth daily She does not cough unless >20mg    Gastroesophageal reflux disease without esophagitis  Comments:  GERD has improved  She will continue her omeprazole 20 mg a day  Follow-up if worsens  Orders:  -     omeprazole (PriLOSEC) 20 mg delayed release capsule; Take 1 capsule (20 mg total) by mouth daily    Dyslipidemia  Comments:  wants to go back to pravachol  Orders:  -     pravastatin (PRAVACHOL) 10 mg tablet; Take 1 tablet (10 mg total) by mouth daily at bedtime She can take pravachol  -     Comprehensive metabolic panel; Future  -     Lipid panel; Future    SSS (sick sinus syndrome) ([facility])  Comments:  sees card dr [facility]aine[facility])    Lupus anticoagulant disorder ([facility] )    Other orders  -     Discontinue: rosuvastatin (CRESTOR) 10 MG tablet; Take 10 mg by mouth every other day Alternate with 5 mg every other day  -     Discontinue: rosuvastatin (CRESTOR) 5 mg tablet; Take 5 mg by mouth every other day Alternate every other day with 10 mg  -     psyllium (METAMUCIL) 58 6 % powder; Take 1 packet by mouth daily          Subjective:      Patient ID: Bella South is a 79 y o  female      Pt relates her bp is okay      The following portions of the patient's history were reviewed and updated as appropriate: She  has a past medical history of Anxiety, Chronic back pain, Degenerative joint disease, Depression, Dyslipidemia, Hiatal hernia, Hyperglycemia, Lupus anticoagulant positive, Osteoarthritis, Seasonal allergies, Sick sinus syndrome ([place] [address] ), Sinus bradycardia, Tachycardia-bradycardia syndrome ([place] [address] ), Total knee replacement status, and Vitamin D deficiency  She   Patient Active Problem List    Diagnosis Date Noted    Dyslipidemia     Lupus ([place] [address] ) 11/30/2020    Obesity, morbid ([place] [address] ) 11/30/2020    Lupus anticoagulant disorder ([place] [address] ) 11/30/2020    Lupus anticoagulant positive     Claudication ([place] [address] ) 05/08/2020    Gastroesophageal reflux disease without esophagitis 03/19/2020    Allergic rhinitis 03/19/2020    SSS (sick sinus syndrome) ([place] [address] ) 04/08/2019    History of sinus bradycardia 04/01/2019    Pain in both lower legs 04/01/2019    History of bilateral knee replacement 04/01/2019    Pacemaker 03/08/2018    DJD (degenerative joint disease) 10/21/2017    Mixed hyperlipidemia 10/06/2016    Severe aortic stenosis 10/06/2016    Hypertension 06/07/2012     She  has a past surgical history that includes Breast lumpectomy (Bilateral); Knee surgery; and Tubal ligation  Her family history includes Allergies in her family; Diabetes type II in her brother, father, and sister; Hypertension in her family; Stroke in her father and mother  She  reports that she has quit smoking  She has a 22 50 pack-year smoking history  She has never used smokeless tobacco  She reports current alcohol use  She reports that she does not use drugs    Current Outpatient Medications   Medication Sig Dispense Refill    ALPRAZolam (XANAX) 0 25 mg tablet One tab one hour before plane flight 30 tablet 0    amLODIPine (NORVASC) 2 5 mg tablet Take 1 tablet (2 5 mg total) by mouth daily 90 tablet 1    Ascorbic Acid (vitamin C) 100 MG tablet Take 100 mg by mouth daily      Cholecalciferol (VITAMIN D3) 5000 units CAPS Take by mouth      Cyanocobalamin (VITAMIN B-12 PO) Take by mouth      cyclobenzaprine (FLEXERIL) 10 mg tablet Take 1 tablet (10 mg total) by mouth daily at bedtime 30 tablet 1    fexofenadine (ALLEGRA) 60 MG tablet Take 1 tablet (60 mg total) by mouth 2 (two) times a day 180 tablet 1    lidocaine (LIDODERM) 5 % Apply 1 patch topically daily 90 patch 1    lisinopril (ZESTRIL) 20 mg tablet Take 1 tablet (20 mg total) by mouth daily She does not cough unless >20mg 90 tablet 1    meclizine (ANTIVERT) 12 5 MG tablet Take 1 tablet (12 5 mg total) by mouth 3 (three) times a day as needed for dizziness 30 tablet 1    meloxicam (MOBIC) 15 mg tablet Take 1 tablet (15 mg total) by mouth daily 30 tablet 1    mometasone (ELOCON) 0 1 % cream Apply topically daily 45 g 0    Multiple Vitamin (MULTIVITAMINS PO) Take by mouth      omeprazole (PriLOSEC) 20 mg delayed release capsule Take 1 capsule (20 mg total) by mouth daily 90 capsule 1    psyllium (METAMUCIL) 58 6 % powder Take 1 packet by mouth daily      pravastatin (PRAVACHOL) 10 mg tablet Take 1 tablet (10 mg total) by mouth daily at bedtime She can take pravachol 90 tablet 1     No current facility-administered medications for this visit        Current Outpatient Medications on File Prior to Visit   Medication Sig    ALPRAZolam (XANAX) 0 25 mg tablet One tab one hour before plane flight    Ascorbic Acid (vitamin C) 100 MG tablet Take 100 mg by mouth daily    Cholecalciferol (VITAMIN D3) 5000 units CAPS Take by mouth    Cyanocobalamin (VITAMIN B-12 PO) Take by mouth    cyclobenzaprine (FLEXERIL) 10 mg tablet Take 1 tablet (10 mg total) by mouth daily at bedtime    fexofenadine (ALLEGRA) 60 MG tablet Take 1 tablet (60 mg total) by mouth 2 (two) times a day    lidocaine (LIDODERM) 5 % Apply 1 patch topically daily    meclizine (ANTIVERT) 12 5 MG tablet Take 1 tablet (12 5 mg total) by mouth 3 (three) times a day as needed for dizziness    meloxicam (MOBIC) 15 mg tablet Take 1 tablet (15 mg total) by mouth daily    mometasone (ELOCON) 0 1 % cream Apply topically daily    Multiple Vitamin (MULTIVITAMINS PO) Take by mouth    psyllium (METAMUCIL) 58 6 % powder Take 1 packet by mouth daily    [DISCONTINUED] amLODIPine (NORVASC) 2 5 mg tablet Take 1 tablet (2 5 mg total) by mouth daily    [DISCONTINUED] lisinopril (ZESTRIL) 20 mg tablet Take 1 5 tablets (30 mg total) by mouth daily    [DISCONTINUED] omeprazole (PriLOSEC) 20 mg delayed release capsule Take 1 capsule (20 mg total) by mouth daily    [DISCONTINUED] rosuvastatin (CRESTOR) 10 MG tablet Take 10 mg by mouth every other day Alternate with 5 mg every other day    [DISCONTINUED] rosuvastatin (CRESTOR) 5 mg tablet Take 5 mg by mouth every other day Alternate every other day with 10 mg     No current facility-administered medications on file prior to visit  She is allergic to amoxicillin; atorvastatin; augmentin es-600  [amoxicillin-pot clavulanate]; lisinopril; metoprolol; penicillins; latex; and wound dressings       Review of Systems   Constitutional: Negative  HENT: Negative  Respiratory: Negative  Cardiovascular: Negative  Neurological: Negative for headaches  Objective:      /64 (BP Location: Right arm, Patient Position: Sitting, Cuff Size: Large)   Pulse 61   Temp (!) 96 9 °F (36 1 °C) (Temporal)   Resp 16   Ht 5' 0 25" (1 53 m)   Wt 83 kg (183 lb)   SpO2 98%   BMI 35 44 kg/m²          Physical Exam  Constitutional:       Appearance: Normal appearance  HENT:      Head: Normocephalic and atraumatic  Right Ear: Tympanic membrane and ear canal normal       Left Ear: Tympanic membrane and ear canal normal    Neck:      Musculoskeletal: Neck supple  Cardiovascular:      Rate and Rhythm: Normal rate and regular rhythm  Pulmonary:      Effort: Pulmonary effort is normal       Breath sounds: Normal breath sounds  Lymphadenopathy:      Cervical: No cervical adenopathy  Neurological:      Mental Status: She is alert

## 2021-03-10 DIAGNOSIS — Z23 ENCOUNTER FOR IMMUNIZATION: ICD-10-CM

## 2021-03-23 ENCOUNTER — PREPPED CHART (OUTPATIENT)
Dept: URBAN - METROPOLITAN AREA CLINIC 6 | Facility: CLINIC | Age: 71
End: 2021-03-23

## 2021-04-08 DIAGNOSIS — L30.9 ECZEMA, UNSPECIFIED TYPE: ICD-10-CM

## 2021-04-08 RX ORDER — MOMETASONE FUROATE 1 MG/G
CREAM TOPICAL DAILY
Qty: 45 G | Refills: 0 | Status: SHIPPED | OUTPATIENT
Start: 2021-04-08

## 2021-04-09 RX ORDER — MOMETASONE FUROATE 1 MG/G
CREAM TOPICAL
Qty: 45 G | Refills: 0 | Status: SHIPPED | OUTPATIENT
Start: 2021-04-09 | End: 2022-01-18

## 2021-07-06 DIAGNOSIS — I10 ESSENTIAL HYPERTENSION: ICD-10-CM

## 2021-07-06 DIAGNOSIS — E78.5 DYSLIPIDEMIA: ICD-10-CM

## 2021-07-07 DIAGNOSIS — G45.9 TIA (TRANSIENT ISCHEMIC ATTACK): ICD-10-CM

## 2021-07-07 DIAGNOSIS — D50.9 IRON DEFICIENCY ANEMIA, UNSPECIFIED IRON DEFICIENCY ANEMIA TYPE: Primary | ICD-10-CM

## 2021-07-07 RX ORDER — ASPIRIN 81 MG/1
81 TABLET ORAL DAILY
Qty: 90 TABLET | Refills: 1 | Status: SHIPPED | OUTPATIENT
Start: 2021-07-07 | End: 2021-09-28 | Stop reason: SDUPTHER

## 2021-07-07 RX ORDER — AMLODIPINE BESYLATE 2.5 MG/1
2.5 TABLET ORAL DAILY
Qty: 90 TABLET | Refills: 1 | Status: SHIPPED | OUTPATIENT
Start: 2021-07-07 | End: 2021-09-10 | Stop reason: SDUPTHER

## 2021-07-07 RX ORDER — PRAVASTATIN SODIUM 10 MG
10 TABLET ORAL
Qty: 90 TABLET | Refills: 1 | Status: SHIPPED | OUTPATIENT
Start: 2021-07-07 | End: 2021-09-10 | Stop reason: SDUPTHER

## 2021-07-07 RX ORDER — IRON POLYSACCHARIDE COMPLEX 150 MG
150 CAPSULE ORAL 2 TIMES DAILY
Qty: 90 CAPSULE | Refills: 1 | Status: SHIPPED | OUTPATIENT
Start: 2021-07-07

## 2021-07-07 RX ORDER — LISINOPRIL 20 MG/1
20 TABLET ORAL DAILY
Qty: 90 TABLET | Refills: 1 | Status: SHIPPED | OUTPATIENT
Start: 2021-07-07 | End: 2021-09-10 | Stop reason: SDUPTHER

## 2021-09-10 DIAGNOSIS — I10 ESSENTIAL HYPERTENSION: ICD-10-CM

## 2021-09-10 DIAGNOSIS — J30.9 ALLERGIC RHINITIS, UNSPECIFIED SEASONALITY, UNSPECIFIED TRIGGER: ICD-10-CM

## 2021-09-10 DIAGNOSIS — E78.5 DYSLIPIDEMIA: ICD-10-CM

## 2021-09-10 RX ORDER — AMLODIPINE BESYLATE 2.5 MG/1
2.5 TABLET ORAL DAILY
Qty: 90 TABLET | Refills: 1 | Status: SHIPPED | OUTPATIENT
Start: 2021-09-10 | End: 2022-01-18

## 2021-09-10 RX ORDER — LISINOPRIL 20 MG/1
20 TABLET ORAL DAILY
Qty: 90 TABLET | Refills: 1 | Status: SHIPPED | OUTPATIENT
Start: 2021-09-10 | End: 2022-03-29 | Stop reason: SDUPTHER

## 2021-09-10 RX ORDER — FEXOFENADINE HYDROCHLORIDE 60 MG/1
60 TABLET, FILM COATED ORAL 2 TIMES DAILY
Qty: 180 TABLET | Refills: 1 | Status: SHIPPED | OUTPATIENT
Start: 2021-09-10 | End: 2021-09-28 | Stop reason: SDUPTHER

## 2021-09-10 RX ORDER — PRAVASTATIN SODIUM 10 MG
10 TABLET ORAL
Qty: 90 TABLET | Refills: 1 | Status: SHIPPED | OUTPATIENT
Start: 2021-09-10 | End: 2022-01-18 | Stop reason: SDUPTHER

## 2021-09-10 NOTE — TELEPHONE ENCOUNTER
Pt requesting Rx's be sent to [facility]    She was last seen in Feb & has an upcoming appt at the end of Sept

## 2021-09-28 ENCOUNTER — CLINICAL SUPPORT (OUTPATIENT)
Dept: URGENT CARE | Facility: MEDICAL CENTER | Age: 71
End: 2021-09-28
Payer: MEDICARE

## 2021-09-28 ENCOUNTER — OFFICE VISIT (OUTPATIENT)
Dept: FAMILY MEDICINE CLINIC | Facility: CLINIC | Age: 71
End: 2021-09-28
Payer: MEDICARE

## 2021-09-28 VITALS
DIASTOLIC BLOOD PRESSURE: 80 MMHG | TEMPERATURE: 98.3 F | HEART RATE: 67 BPM | SYSTOLIC BLOOD PRESSURE: 110 MMHG | OXYGEN SATURATION: 98 % | BODY MASS INDEX: 35.97 KG/M2 | HEIGHT: 60 IN | WEIGHT: 183.2 LBS

## 2021-09-28 DIAGNOSIS — I35.0 SEVERE AORTIC STENOSIS: Primary | ICD-10-CM

## 2021-09-28 DIAGNOSIS — I49.5 SSS (SICK SINUS SYNDROME) (HCC): ICD-10-CM

## 2021-09-28 DIAGNOSIS — G45.9 TIA (TRANSIENT ISCHEMIC ATTACK): ICD-10-CM

## 2021-09-28 DIAGNOSIS — K21.9 GASTROESOPHAGEAL REFLUX DISEASE WITHOUT ESOPHAGITIS: ICD-10-CM

## 2021-09-28 DIAGNOSIS — E78.5 DYSLIPIDEMIA: ICD-10-CM

## 2021-09-28 DIAGNOSIS — J30.9 ALLERGIC RHINITIS, UNSPECIFIED SEASONALITY, UNSPECIFIED TRIGGER: ICD-10-CM

## 2021-09-28 DIAGNOSIS — Z23 ENCOUNTER FOR IMMUNIZATION: ICD-10-CM

## 2021-09-28 DIAGNOSIS — Z95.0 PACEMAKER: ICD-10-CM

## 2021-09-28 DIAGNOSIS — Z01.818 PREOP EXAMINATION: ICD-10-CM

## 2021-09-28 LAB
ATRIAL RATE: 60 BPM
P AXIS: 71 DEGREES
PR INTERVAL: 164 MS
QRS AXIS: 59 DEGREES
QRSD INTERVAL: 90 MS
QT INTERVAL: 466 MS
QTC INTERVAL: 466 MS
T WAVE AXIS: 45 DEGREES
VENTRICULAR RATE: 60 BPM

## 2021-09-28 PROCEDURE — 93005 ELECTROCARDIOGRAM TRACING: CPT

## 2021-09-28 PROCEDURE — G0008 ADMIN INFLUENZA VIRUS VAC: HCPCS

## 2021-09-28 PROCEDURE — 90662 IIV NO PRSV INCREASED AG IM: CPT

## 2021-09-28 PROCEDURE — 99214 OFFICE O/P EST MOD 30 MIN: CPT | Performed by: INTERNAL MEDICINE

## 2021-09-28 PROCEDURE — 93010 ELECTROCARDIOGRAM REPORT: CPT | Performed by: INTERNAL MEDICINE

## 2021-09-28 RX ORDER — OMEPRAZOLE 20 MG/1
20 CAPSULE, DELAYED RELEASE ORAL DAILY
Qty: 90 CAPSULE | Refills: 1 | Status: SHIPPED | OUTPATIENT
Start: 2021-09-28 | End: 2022-03-29 | Stop reason: SDUPTHER

## 2021-09-28 RX ORDER — FEXOFENADINE HYDROCHLORIDE 60 MG/1
60 TABLET, FILM COATED ORAL 2 TIMES DAILY
Qty: 180 TABLET | Refills: 1 | Status: SHIPPED | OUTPATIENT
Start: 2021-09-28 | End: 2021-10-26 | Stop reason: SDUPTHER

## 2021-09-28 RX ORDER — ASPIRIN 81 MG/1
81 TABLET ORAL DAILY
Qty: 90 TABLET | Refills: 1 | Status: SHIPPED | OUTPATIENT
Start: 2021-09-28 | End: 2022-01-18 | Stop reason: SDUPTHER

## 2021-09-28 NOTE — PROGRESS NOTES
Assessment/Plan:         Diagnoses and all orders for this visit:    Severe aortic stenosis  Comments:  for tvar    Allergic rhinitis, unspecified seasonality, unspecified trigger  Comments: Will reorder her Allegra 60 mg for twice daily use  Orders:  -     fexofenadine (ALLEGRA) 60 MG tablet; Take 1 tablet (60 mg total) by mouth 2 (two) times a day    Gastroesophageal reflux disease without esophagitis  Comments:  GERD has improved  She will continue her omeprazole 20 mg a day  Follow-up if worsens  Orders:  -     omeprazole (PriLOSEC) 20 mg delayed release capsule; Take 1 capsule (20 mg total) by mouth daily    TIA (transient ischemic attack)  Comments:  had over one year ago  Orders:  -     aspirin (ECOTRIN LOW STRENGTH) 81 mg EC tablet; Take 1 tablet (81 mg total) by mouth daily    SSS (sick sinus syndrome) ([facility])  Comments:  has pacer    Pacemaker    Dyslipidemia  Comments:  on pm statin    Preop examination  -     ECG 12 lead; Future    Encounter for immunization  -     influenza vaccine, high-dose, PF 0 7 mL (FLUZONE HIGH-DOSE)          Subjective:      Patient ID: Jodi Ponce is a 70 y o  female  Pt here for pre-op clearance  +htn-controlled  Denies dm, +sss-s/p pacer  Denies mi,  Denies problem with gen anesthesia  Saw card/oncology and cleared for surgery  +hx severe AS      The following portions of the patient's history were reviewed and updated as appropriate: She  has a past medical history of Anxiety, Chronic back pain, Degenerative joint disease, Depression, Dyslipidemia, Hiatal hernia, Hyperglycemia, Lupus anticoagulant positive, Osteoarthritis, Seasonal allergies, Sick sinus syndrome (HCC), Sinus bradycardia, Tachycardia-bradycardia syndrome ([address] ), Total knee replacement status, and Vitamin D deficiency    She   Patient Active Problem List    Diagnosis Date Noted    Dyslipidemia     Lupus ([facility] ) 11/30/2020    Obesity, morbid ([facility] ) 11/30/2020    Lupus anticoagulant disorder ([facility] ) 11/30/2020    Lupus anticoagulant positive     Claudication ([facility] ) 05/08/2020    Gastroesophageal reflux disease without esophagitis 03/19/2020    Allergic rhinitis 03/19/2020    SSS (sick sinus syndrome) ([facility] ) 04/08/2019    History of sinus bradycardia 04/01/2019    Pain in both lower legs 04/01/2019    History of bilateral knee replacement 04/01/2019    Pacemaker 03/08/2018    DJD (degenerative joint disease) 10/21/2017    Mixed hyperlipidemia 10/06/2016    Severe aortic stenosis 10/06/2016    Hypertension 06/07/2012     She  has a past surgical history that includes Breast lumpectomy (Bilateral); Knee surgery; Tubal ligation; and Cardiac pacemaker placement  Her family history includes Allergies in her family; Diabetes type II in her brother, father, and sister; Hypertension in her family; Stroke in her father and mother  She  reports that she quit smoking about 11 years ago  She has a 22 50 pack-year smoking history  She has never used smokeless tobacco  She reports current alcohol use  She reports that she does not use drugs    Current Outpatient Medications   Medication Sig Dispense Refill    ALPRAZolam (XANAX) 0 25 mg tablet One tab one hour before plane flight 30 tablet 0    amLODIPine (NORVASC) 2 5 mg tablet Take 1 tablet (2 5 mg total) by mouth daily 90 tablet 1    Ascorbic Acid (vitamin C) 100 MG tablet Take 100 mg by mouth daily      aspirin (ECOTRIN LOW STRENGTH) 81 mg EC tablet Take 1 tablet (81 mg total) by mouth daily 90 tablet 1    Cholecalciferol (VITAMIN D3) 5000 units CAPS Take by mouth      Cyanocobalamin (VITAMIN B-12 PO) Take by mouth      cyclobenzaprine (FLEXERIL) 10 mg tablet Take 1 tablet (10 mg total) by mouth daily at bedtime 30 tablet 1    fexofenadine (ALLEGRA) 60 MG tablet Take 1 tablet (60 mg total) by mouth 2 (two) times a day 180 tablet 1    iron polysaccharides (FERREX) 150 mg capsule Take 1 capsule (150 mg total) by mouth 2 (two) times a day 90 capsule 1    lidocaine (LIDODERM) 5 % Apply 1 patch topically daily 90 patch 1    lisinopril (ZESTRIL) 20 mg tablet Take 1 tablet (20 mg total) by mouth daily She does not cough unless >20mg 90 tablet 1    meclizine (ANTIVERT) 12 5 MG tablet Take 1 tablet (12 5 mg total) by mouth 3 (three) times a day as needed for dizziness 30 tablet 1    mometasone (ELOCON) 0 1 % cream Apply topically daily 45 g 0    mometasone (ELOCON) 0 1 % cream apply to affected area once daily 45 g 0    Multiple Vitamin (MULTIVITAMINS PO) Take by mouth      omeprazole (PriLOSEC) 20 mg delayed release capsule Take 1 capsule (20 mg total) by mouth daily 90 capsule 1    pravastatin (PRAVACHOL) 10 mg tablet Take 1 tablet (10 mg total) by mouth daily at bedtime She can take pravachol 90 tablet 1    psyllium (METAMUCIL) 58 6 % powder Take 1 packet by mouth daily       No current facility-administered medications for this visit       Current Outpatient Medications on File Prior to Visit   Medication Sig    ALPRAZolam (XANAX) 0 25 mg tablet One tab one hour before plane flight    amLODIPine (NORVASC) 2 5 mg tablet Take 1 tablet (2 5 mg total) by mouth daily    Ascorbic Acid (vitamin C) 100 MG tablet Take 100 mg by mouth daily    Cholecalciferol (VITAMIN D3) 5000 units CAPS Take by mouth    Cyanocobalamin (VITAMIN B-12 PO) Take by mouth    cyclobenzaprine (FLEXERIL) 10 mg tablet Take 1 tablet (10 mg total) by mouth daily at bedtime    iron polysaccharides (FERREX) 150 mg capsule Take 1 capsule (150 mg total) by mouth 2 (two) times a day    lidocaine (LIDODERM) 5 % Apply 1 patch topically daily    lisinopril (ZESTRIL) 20 mg tablet Take 1 tablet (20 mg total) by mouth daily She does not cough unless >20mg    meclizine (ANTIVERT) 12 5 MG tablet Take 1 tablet (12 5 mg total) by mouth 3 (three) times a day as needed for dizziness    mometasone (ELOCON) 0 1 % cream Apply topically daily    mometasone (ELOCON) 0 1 % cream apply to affected area once daily    Multiple Vitamin (MULTIVITAMINS PO) Take by mouth    pravastatin (PRAVACHOL) 10 mg tablet Take 1 tablet (10 mg total) by mouth daily at bedtime She can take pravachol    psyllium (METAMUCIL) 58 6 % powder Take 1 packet by mouth daily     No current facility-administered medications on file prior to visit  She is allergic to amoxicillin, atorvastatin, augmentin es-600  [amoxicillin-pot clavulanate], lisinopril, metoprolol, penicillins, latex, and wound dressings       Review of Systems   Constitutional: Negative for chills and fever  HENT: Negative  Respiratory: Negative  Cardiovascular: Negative  Neurological: Negative for syncope  Objective:      /80 (BP Location: Left arm, Patient Position: Sitting, Cuff Size: Standard)   Pulse 67   Temp 98 3 °F (36 8 °C)   Ht 5' 0 25" (1 53 m)   Wt 83 1 kg (183 lb 3 2 oz)   SpO2 98%   BMI 35 48 kg/m²          Physical Exam  Constitutional:       Appearance: She is obese  HENT:      Head: Normocephalic and atraumatic  Right Ear: Tympanic membrane and ear canal normal       Left Ear: Tympanic membrane and ear canal normal    Cardiovascular:      Rate and Rhythm: Normal rate and regular rhythm  Pulmonary:      Effort: Pulmonary effort is normal       Breath sounds: Normal breath sounds  Musculoskeletal:      Cervical back: Neck supple  Lymphadenopathy:      Cervical: No cervical adenopathy  Neurological:      Mental Status: She is alert

## 2021-09-28 NOTE — PROGRESS NOTES
José Guzmán had walk-in EKG completed on 09/28/21 at 10:08 AM by Daisy Shah for pre surgical purposes  Tolerated without incident

## 2021-09-28 NOTE — PATIENT INSTRUCTIONS
Told pt do not take aspirin/nsaid/ginkoba/gingsing/vit e or fish oil one week before or  Okay to take amlodipine, lishinopril am of surgery with sip of water  Reviewed lab and okay  and will order ekg  Had vini/cath - reports in c-t note

## 2021-10-26 DIAGNOSIS — J30.9 ALLERGIC RHINITIS, UNSPECIFIED SEASONALITY, UNSPECIFIED TRIGGER: ICD-10-CM

## 2021-10-26 RX ORDER — FEXOFENADINE HYDROCHLORIDE 60 MG/1
60 TABLET, FILM COATED ORAL 2 TIMES DAILY
Qty: 180 TABLET | Refills: 1 | Status: SHIPPED | OUTPATIENT
Start: 2021-10-26 | End: 2021-10-26 | Stop reason: SDUPTHER

## 2021-10-26 RX ORDER — FEXOFENADINE HYDROCHLORIDE 60 MG/1
60 TABLET, FILM COATED ORAL 2 TIMES DAILY
Qty: 180 TABLET | Refills: 1 | Status: SHIPPED | OUTPATIENT
Start: 2021-10-26 | End: 2022-03-29 | Stop reason: SDUPTHER

## 2021-11-04 DIAGNOSIS — R42 VERTIGO: ICD-10-CM

## 2021-11-04 DIAGNOSIS — F41.9 ANXIETY: ICD-10-CM

## 2021-11-04 RX ORDER — ALPRAZOLAM 0.25 MG/1
TABLET ORAL
Qty: 10 TABLET | Refills: 0 | Status: SHIPPED | OUTPATIENT
Start: 2021-11-04 | End: 2022-07-19 | Stop reason: SDUPTHER

## 2021-11-04 RX ORDER — MECLIZINE HCL 12.5 MG/1
12.5 TABLET ORAL 3 TIMES DAILY PRN
Qty: 30 TABLET | Refills: 1 | Status: SHIPPED | OUTPATIENT
Start: 2021-11-04 | End: 2022-02-04 | Stop reason: SDUPTHER

## 2021-12-09 ENCOUNTER — TELEPHONE (OUTPATIENT)
Dept: FAMILY MEDICINE CLINIC | Facility: CLINIC | Age: 71
End: 2021-12-09

## 2021-12-28 ENCOUNTER — TELEPHONE (OUTPATIENT)
Dept: FAMILY MEDICINE CLINIC | Facility: CLINIC | Age: 71
End: 2021-12-28

## 2021-12-28 DIAGNOSIS — Z20.822 EXPOSURE TO COVID-19 VIRUS: ICD-10-CM

## 2021-12-28 DIAGNOSIS — R51.9 GENERALIZED HEADACHE: Primary | ICD-10-CM

## 2021-12-28 DIAGNOSIS — R05.9 COUGH: ICD-10-CM

## 2021-12-29 PROCEDURE — U0005 INFEC AGEN DETEC AMPLI PROBE: HCPCS | Performed by: INTERNAL MEDICINE

## 2021-12-29 PROCEDURE — U0003 INFECTIOUS AGENT DETECTION BY NUCLEIC ACID (DNA OR RNA); SEVERE ACUTE RESPIRATORY SYNDROME CORONAVIRUS 2 (SARS-COV-2) (CORONAVIRUS DISEASE [COVID-19]), AMPLIFIED PROBE TECHNIQUE, MAKING USE OF HIGH THROUGHPUT TECHNOLOGIES AS DESCRIBED BY CMS-2020-01-R: HCPCS | Performed by: INTERNAL MEDICINE

## 2021-12-30 LAB — SARS-COV-2 RNA RESP QL NAA+PROBE: NEGATIVE

## 2022-01-18 ENCOUNTER — OFFICE VISIT (OUTPATIENT)
Dept: FAMILY MEDICINE CLINIC | Facility: CLINIC | Age: 72
End: 2022-01-18
Payer: MEDICARE

## 2022-01-18 ENCOUNTER — NURSE TRIAGE (OUTPATIENT)
Dept: OTHER | Facility: OTHER | Age: 72
End: 2022-01-18

## 2022-01-18 VITALS
RESPIRATION RATE: 16 BRPM | TEMPERATURE: 97.7 F | DIASTOLIC BLOOD PRESSURE: 68 MMHG | WEIGHT: 176 LBS | OXYGEN SATURATION: 98 % | BODY MASS INDEX: 34.55 KG/M2 | HEART RATE: 64 BPM | SYSTOLIC BLOOD PRESSURE: 144 MMHG | HEIGHT: 60 IN

## 2022-01-18 DIAGNOSIS — E66.01 OBESITY, MORBID (HCC): ICD-10-CM

## 2022-01-18 DIAGNOSIS — I49.5 SSS (SICK SINUS SYNDROME) (HCC): ICD-10-CM

## 2022-01-18 DIAGNOSIS — Z00.00 MEDICARE ANNUAL WELLNESS VISIT, SUBSEQUENT: Primary | ICD-10-CM

## 2022-01-18 DIAGNOSIS — Z86.010 HISTORY OF COLON POLYPS: ICD-10-CM

## 2022-01-18 DIAGNOSIS — I73.9 CLAUDICATION (HCC): ICD-10-CM

## 2022-01-18 DIAGNOSIS — G45.9 TIA (TRANSIENT ISCHEMIC ATTACK): ICD-10-CM

## 2022-01-18 DIAGNOSIS — R39.9 UTI SYMPTOMS: ICD-10-CM

## 2022-01-18 DIAGNOSIS — M32.9 LUPUS (HCC): ICD-10-CM

## 2022-01-18 DIAGNOSIS — E78.5 DYSLIPIDEMIA: ICD-10-CM

## 2022-01-18 DIAGNOSIS — D68.62 LUPUS ANTICOAGULANT DISORDER (HCC): ICD-10-CM

## 2022-01-18 LAB
SL AMB  POCT GLUCOSE, UA: NORMAL
SL AMB LEUKOCYTE ESTERASE,UA: NORMAL
SL AMB POCT BILIRUBIN,UA: NORMAL
SL AMB POCT BLOOD,UA: NORMAL
SL AMB POCT CLARITY,UA: NORMAL
SL AMB POCT COLOR,UA: YELLOW
SL AMB POCT KETONES,UA: NORMAL
SL AMB POCT NITRITE,UA: NORMAL
SL AMB POCT PH,UA: 5
SL AMB POCT SPECIFIC GRAVITY,UA: 1
SL AMB POCT URINE PROTEIN: NORMAL
SL AMB POCT UROBILINOGEN: NORMAL

## 2022-01-18 PROCEDURE — 81002 URINALYSIS NONAUTO W/O SCOPE: CPT | Performed by: INTERNAL MEDICINE

## 2022-01-18 PROCEDURE — 99214 OFFICE O/P EST MOD 30 MIN: CPT | Performed by: INTERNAL MEDICINE

## 2022-01-18 PROCEDURE — 1123F ACP DISCUSS/DSCN MKR DOCD: CPT | Performed by: INTERNAL MEDICINE

## 2022-01-18 PROCEDURE — 87086 URINE CULTURE/COLONY COUNT: CPT | Performed by: INTERNAL MEDICINE

## 2022-01-18 PROCEDURE — 87077 CULTURE AEROBIC IDENTIFY: CPT | Performed by: INTERNAL MEDICINE

## 2022-01-18 PROCEDURE — 87186 SC STD MICRODIL/AGAR DIL: CPT | Performed by: INTERNAL MEDICINE

## 2022-01-18 PROCEDURE — G0439 PPPS, SUBSEQ VISIT: HCPCS | Performed by: INTERNAL MEDICINE

## 2022-01-18 RX ORDER — ASPIRIN 81 MG/1
81 TABLET ORAL DAILY
Qty: 90 TABLET | Refills: 1 | Status: SHIPPED | OUTPATIENT
Start: 2022-01-18

## 2022-01-18 RX ORDER — PRAVASTATIN SODIUM 10 MG
20 TABLET ORAL
Qty: 90 TABLET | Refills: 1 | Status: SHIPPED | OUTPATIENT
Start: 2022-01-18 | End: 2022-02-04

## 2022-01-18 RX ORDER — SULFAMETHOXAZOLE AND TRIMETHOPRIM 800; 160 MG/1; MG/1
1 TABLET ORAL EVERY 12 HOURS SCHEDULED
Qty: 14 TABLET | Refills: 0 | Status: SHIPPED | OUTPATIENT
Start: 2022-01-18 | End: 2022-01-25

## 2022-01-18 NOTE — PATIENT INSTRUCTIONS
Medicare Preventive Visit Patient Instructions  Thank you for completing your Welcome to Medicare Visit or Medicare Annual Wellness Visit today  Your next wellness visit will be due in one year (1/19/2023)  The screening/preventive services that you may require over the next 5-10 years are detailed below  Some tests may not apply to you based off risk factors and/or age  Screening tests ordered at today's visit but not completed yet may show as past due  Also, please note that scanned in results may not display below  Preventive Screenings:  Service Recommendations Previous Testing/Comments   Colorectal Cancer Screening  * Colonoscopy    * Fecal Occult Blood Test (FOBT)/Fecal Immunochemical Test (FIT)  * Fecal DNA/Cologuard Test  * Flexible Sigmoidoscopy Age: 54-65 years old   Colonoscopy: every 10 years (may be performed more frequently if at higher risk)  OR  FOBT/FIT: every 1 year  OR  Cologuard: every 3 years  OR  Sigmoidoscopy: every 5 years  Screening may be recommended earlier than age 48 if at higher risk for colorectal cancer  Also, an individualized decision between you and your healthcare provider will decide whether screening between the ages of 74-80 would be appropriate  Colonoscopy: 05/04/2010  FOBT/FIT: Not on file  Cologuard: Not on file  Sigmoidoscopy: Not on file          Breast Cancer Screening Age: 36 years old  Frequency: every 1-2 years  Not required if history of left and right mastectomy Mammogram: 05/31/2019        Cervical Cancer Screening Between the ages of 21-29, pap smear recommended once every 3 years  Between the ages of 33-67, can perform pap smear with HPV co-testing every 5 years     Recommendations may differ for women with a history of total hysterectomy, cervical cancer, or abnormal pap smears in past  Pap Smear: Not on file    Screening Not Indicated   Hepatitis C Screening Once for adults born between [facility] [place]  More frequently in patients at high risk for Hepatitis C Hep C Antibody: Not on file        Diabetes Screening 1-2 times per year if you're at risk for diabetes or have pre-diabetes Fasting glucose: 98 mg/dL   A1C: 5 7 %        Cholesterol Screening Once every 5 years if you don't have a lipid disorder  May order more often based on risk factors  Lipid panel: 11/01/2021    Screening Not Indicated  History Lipid Disorder     Other Preventive Screenings Covered by Medicare:  1  Abdominal Aortic Aneurysm (AAA) Screening: covered once if your at risk  You're considered to be at risk if you have a family history of AAA  2  Lung Cancer Screening: covers low dose CT scan once per year if you meet all of the following conditions: (1) Age 50-69; (2) No signs or symptoms of lung cancer; (3) Current smoker or have quit smoking within the last 15 years; (4) You have a tobacco smoking history of at least 30 pack years (packs per day multiplied by number of years you smoked); (5) You get a written order from a healthcare provider  3  Glaucoma Screening: covered annually if you're considered high risk: (1) You have diabetes OR (2) Family history of glaucoma OR (3)  aged 48 and older OR (3)  American aged 72 and older  3  Osteoporosis Screening: covered every 2 years if you meet one of the following conditions: (1) You're estrogen deficient and at risk for osteoporosis based off medical history and other findings; (2) Have a vertebral abnormality; (3) On glucocorticoid therapy for more than 3 months; (4) Have primary hyperparathyroidism; (5) On osteoporosis medications and need to assess response to drug therapy  · Last bone density test (DXA Scan): Not on file  5  HIV Screening: covered annually if you're between the age of 12-76  Also covered annually if you are younger than 13 and older than 72 with risk factors for HIV infection  For pregnant patients, it is covered up to 3 times per pregnancy      Immunizations:  Immunization Recommendations   Influenza Vaccine Annual influenza vaccination during flu season is recommended for all persons aged >= 6 months who do not have contraindications   Pneumococcal Vaccine (Prevnar and Pneumovax)  * Prevnar = PCV13  * Pneumovax = PPSV23   Adults 25-60 years old: 1-3 doses may be recommended based on certain risk factors  Adults 72 years old: Prevnar (PCV13) vaccine recommended followed by Pneumovax (PPSV23) vaccine  If already received PPSV23 since turning 65, then PCV13 recommended at least one year after PPSV23 dose  Hepatitis B Vaccine 3 dose series if at intermediate or high risk (ex: diabetes, end stage renal disease, liver disease)   Tetanus (Td) Vaccine - COST NOT COVERED BY MEDICARE PART B Following completion of primary series, a booster dose should be given every 10 years to maintain immunity against tetanus  Td may also be given as tetanus wound prophylaxis  Tdap Vaccine - COST NOT COVERED BY MEDICARE PART B Recommended at least once for all adults  For pregnant patients, recommended with each pregnancy  Shingles Vaccine (Shingrix) - COST NOT COVERED BY MEDICARE PART B  2 shot series recommended in those aged 48 and above     Health Maintenance Due:      Topic Date Due    Hepatitis C Screening  Never done    DXA SCAN  Never done    Lung Cancer Screening  Never done    Colorectal Cancer Screening  05/04/2020    Breast Cancer Screening: Mammogram  05/31/2020     Immunizations Due:      Topic Date Due    DTaP,Tdap,and Td Vaccines (1 - Tdap) 09/26/2006    Pneumococcal Vaccine: 65+ Years (1 of 1 - PPSV23) Never done    COVID-19 Vaccine (2 - Moderna 3-dose series) 11/22/2021     Advance Directives   What are advance directives? Advance directives are legal documents that state your wishes and plans for medical care  These plans are made ahead of time in case you lose your ability to make decisions for yourself   Advance directives can apply to any medical decision, such as the treatments you want, and if you want to donate organs  What are the types of advance directives? There are many types of advance directives, and each state has rules about how to use them  You may choose a combination of any of the following:  · Living will: This is a written record of the treatment you want  You can also choose which treatments you do not want, which to limit, and which to stop at a certain time  This includes surgery, medicine, IV fluid, and tube feedings  · Durable power of  for healthcare [facility]): This is a written record that states who you want to make healthcare choices for you when you are unable to make them for yourself  This person, called a proxy, is usually a family member or a friend  You may choose more than 1 proxy  · Do not resuscitate (DNR) order:  A DNR order is used in case your heart stops beating or you stop breathing  It is a request not to have certain forms of treatment, such as CPR  A DNR order may be included in other types of advance directives  · Medical directive: This covers the care that you want if you are in a coma, near death, or unable to make decisions for yourself  You can list the treatments you want for each condition  Treatment may include pain medicine, surgery, blood transfusions, dialysis, IV or tube feedings, and a ventilator (breathing machine)  · Values history: This document has questions about your views, beliefs, and how you feel and think about life  This information can help others choose the care that you would choose  Why are advance directives important? An advance directive helps you control your care  Although spoken wishes may be used, it is better to have your wishes written down  Spoken wishes can be misunderstood, or not followed  Treatments may be given even if you do not want them  An advance directive may make it easier for your family to make difficult choices about your care     Weight Management   Why it is important to manage your weight: Being overweight increases your risk of health conditions such as heart disease, high blood pressure, type 2 diabetes, and certain types of cancer  It can also increase your risk for osteoarthritis, sleep apnea, and other respiratory problems  Aim for a slow, steady weight loss  Even a small amount of weight loss can lower your risk of health problems  How to lose weight safely:  A safe and healthy way to lose weight is to eat fewer calories and get regular exercise  You can lose up about 1 pound a week by decreasing the number of calories you eat by 500 calories each day  Healthy meal plan for weight management:  A healthy meal plan includes a variety of foods, contains fewer calories, and helps you stay healthy  A healthy meal plan includes the following:  · Eat whole-grain foods more often  A healthy meal plan should contain fiber  Fiber is the part of grains, fruits, and vegetables that is not broken down by your body  Whole-grain foods are healthy and provide extra fiber in your diet  Some examples of whole-grain foods are whole-wheat breads and pastas, oatmeal, brown rice, and bulgur  · Eat a variety of vegetables every day  Include dark, leafy greens such as spinach, kale, eulalio greens, and mustard greens  Eat yellow and orange vegetables such as carrots, sweet potatoes, and winter squash  · Eat a variety of fruits every day  Choose fresh or canned fruit (canned in its own juice or light syrup) instead of juice  Fruit juice has very little or no fiber  · Eat low-fat dairy foods  Drink fat-free (skim) milk or 1% milk  Eat fat-free yogurt and low-fat cottage cheese  Try low-fat cheeses such as mozzarella and other reduced-fat cheeses  · Choose meat and other protein foods that are low in fat  Choose beans or other legumes such as split peas or lentils  Choose fish, skinless poultry (chicken or turkey), or lean cuts of red meat (beef or pork)   Before you cook meat or poultry, cut off any visible fat    · Use less fat and oil  Try baking foods instead of frying them  Add less fat, such as margarine, sour cream, regular salad dressing and mayonnaise to foods  Eat fewer high-fat foods  Some examples of high-fat foods include french fries, doughnuts, ice cream, and cakes  · Eat fewer sweets  Limit foods and drinks that are high in sugar  This includes candy, cookies, regular soda, and sweetened drinks  Exercise:  Exercise at least 30 minutes per day on most days of the week  Some examples of exercise include walking, biking, dancing, and swimming  You can also fit in more physical activity by taking the stairs instead of the elevator or parking farther away from stores  Ask your healthcare provider about the best exercise plan for you  © Copyright [employer] 2018 Information is for End User's use only and may not be sold, redistributed or otherwise used for commercial purposes   All illustrations and images included in CareNotes® are the copyrighted property of [employer]  or [address] [place] [address]

## 2022-01-18 NOTE — PROGRESS NOTES
Depression Screening and Follow-up Plan: Patient was screened for depression during today's encounter  They screened negative with a PHQ-2 score of 0  Assessment/Plan:         Diagnoses and all orders for this visit:    Medicare annual wellness visit, subsequent    UTI symptoms  Comments:  bactrim  Orders:  -     POCT urine dip  -     Urine culture  -     sulfamethoxazole-trimethoprim (BACTRIM DS) 800-160 mg per tablet; Take 1 tablet by mouth every 12 (twelve) hours for 7 days    History of colon polyps  -     Ambulatory referral to Colorectal Surgery; Future    TIA (transient ischemic attack)  Comments:  had over one year ago  Orders:  -     aspirin (ECOTRIN LOW STRENGTH) 81 mg EC tablet; Take 1 tablet (81 mg total) by mouth daily    Dyslipidemia  Comments:  wants to go back to pravachol  Orders:  -     pravastatin (PRAVACHOL) 10 mg tablet; Take 2 tablets (20 mg total) by mouth daily at bedtime She can take pravachol    Lupus (HCC)    Claudication (HCC)    SSS (sick sinus syndrome) (HCC)    Obesity, morbid (HCC)    Lupus anticoagulant disorder ([facility] )          Subjective:      Patient ID: Anna Abbott is a 70 y o  female  Pt complains of bladder infection  +fever  She did covid x 3 and all negative  +dysuria +nocturia x 10   +slight bld/hematuria  +freq +urgent  Denies low back pain  The following portions of the patient's history were reviewed and updated as appropriate: She  has a past medical history of Anxiety, Chronic back pain, Degenerative joint disease, Depression, Dyslipidemia, Hiatal hernia, Hyperglycemia, Lupus anticoagulant positive, Osteoarthritis, Seasonal allergies, Sick sinus syndrome (HCC), Sinus bradycardia, Tachycardia-bradycardia syndrome ([facility] [address] ), Total knee replacement status, and Vitamin D deficiency    She   Patient Active Problem List    Diagnosis Date Noted    Dyslipidemia     Lupus ([facility] ) 11/30/2020    Obesity, morbid ([facility] ) 11/30/2020    Lupus anticoagulant disorder ([facility] [address] ) 11/30/2020    Lupus anticoagulant positive     Claudication ([facility] ) 05/08/2020    Gastroesophageal reflux disease without esophagitis 03/19/2020    Allergic rhinitis 03/19/2020    SSS (sick sinus syndrome) ([facility] ) 04/08/2019    History of sinus bradycardia 04/01/2019    Pain in both lower legs 04/01/2019    History of bilateral knee replacement 04/01/2019    Pacemaker 03/08/2018    DJD (degenerative joint disease) 10/21/2017    Mixed hyperlipidemia 10/06/2016    Severe aortic stenosis 10/06/2016    Hypertension 06/07/2012     She  has a past surgical history that includes Breast lumpectomy (Bilateral); Knee surgery; Tubal ligation; and Cardiac pacemaker placement  Her family history includes Allergies in her family; Diabetes type II in her brother, father, and sister; Hypertension in her family; Stroke in her father and mother  She  reports that she quit smoking about 12 years ago  She has a 22 50 pack-year smoking history  She has never used smokeless tobacco  She reports current alcohol use  She reports that she does not use drugs    Current Outpatient Medications   Medication Sig Dispense Refill    ALPRAZolam (XANAX) 0 25 mg tablet One tab one hour before plane flight 10 tablet 0    Ascorbic Acid (vitamin C) 100 MG tablet Take 100 mg by mouth daily      aspirin (ECOTRIN LOW STRENGTH) 81 mg EC tablet Take 1 tablet (81 mg total) by mouth daily 90 tablet 1    Cholecalciferol (VITAMIN D3) 5000 units CAPS Take by mouth      Cyanocobalamin (VITAMIN B-12 PO) Take by mouth      cyclobenzaprine (FLEXERIL) 10 mg tablet Take 1 tablet (10 mg total) by mouth daily at bedtime 30 tablet 1    fexofenadine (ALLEGRA) 60 MG tablet Take 1 tablet (60 mg total) by mouth 2 (two) times a day 180 tablet 1    iron polysaccharides (FERREX) 150 mg capsule Take 1 capsule (150 mg total) by mouth 2 (two) times a day 90 capsule 1    lidocaine (LIDODERM) 5 % Apply 1 patch topically daily 90 patch 1    lisinopril (ZESTRIL) 20 mg tablet Take 1 tablet (20 mg total) by mouth daily She does not cough unless >20mg 90 tablet 1    meclizine (ANTIVERT) 12 5 MG tablet Take 1 tablet (12 5 mg total) by mouth 3 (three) times a day as needed for dizziness 30 tablet 1    mometasone (ELOCON) 0 1 % cream Apply topically daily 45 g 0    Multiple Vitamin (MULTIVITAMINS PO) Take by mouth      omeprazole (PriLOSEC) 20 mg delayed release capsule Take 1 capsule (20 mg total) by mouth daily 90 capsule 1    pravastatin (PRAVACHOL) 10 mg tablet Take 2 tablets (20 mg total) by mouth daily at bedtime She can take pravachol 90 tablet 1    sulfamethoxazole-trimethoprim (BACTRIM DS) 800-160 mg per tablet Take 1 tablet by mouth every 12 (twelve) hours for 7 days 14 tablet 0     No current facility-administered medications for this visit       Current Outpatient Medications on File Prior to Visit   Medication Sig    ALPRAZolam (XANAX) 0 25 mg tablet One tab one hour before plane flight    Ascorbic Acid (vitamin C) 100 MG tablet Take 100 mg by mouth daily    Cholecalciferol (VITAMIN D3) 5000 units CAPS Take by mouth    Cyanocobalamin (VITAMIN B-12 PO) Take by mouth    cyclobenzaprine (FLEXERIL) 10 mg tablet Take 1 tablet (10 mg total) by mouth daily at bedtime    fexofenadine (ALLEGRA) 60 MG tablet Take 1 tablet (60 mg total) by mouth 2 (two) times a day    iron polysaccharides (FERREX) 150 mg capsule Take 1 capsule (150 mg total) by mouth 2 (two) times a day    lidocaine (LIDODERM) 5 % Apply 1 patch topically daily    lisinopril (ZESTRIL) 20 mg tablet Take 1 tablet (20 mg total) by mouth daily She does not cough unless >20mg    meclizine (ANTIVERT) 12 5 MG tablet Take 1 tablet (12 5 mg total) by mouth 3 (three) times a day as needed for dizziness    mometasone (ELOCON) 0 1 % cream Apply topically daily    Multiple Vitamin (MULTIVITAMINS PO) Take by mouth    omeprazole (PriLOSEC) 20 mg delayed release capsule Take 1 capsule (20 mg total) by mouth daily No current facility-administered medications on file prior to visit  She is allergic to amoxicillin, atorvastatin, augmentin es-600  [amoxicillin-pot clavulanate], lisinopril, metoprolol, penicillins, latex, and wound dressings       Review of Systems   Constitutional: Positive for fever  Genitourinary: Positive for frequency, hematuria and urgency  Objective:      /68 (BP Location: Right arm, Patient Position: Sitting, Cuff Size: Standard)   Pulse 64   Temp 97 7 °F (36 5 °C) (Temporal)   Resp 16   Ht 5' 0 25" (1 53 m)   Wt 79 8 kg (176 lb)   SpO2 98%   BMI 34 09 kg/m²          Physical Exam  Constitutional:       Appearance: She is obese  HENT:      Head: Normocephalic and atraumatic  Cardiovascular:      Rate and Rhythm: Normal rate and regular rhythm  Pulmonary:      Effort: Pulmonary effort is normal       Breath sounds: Normal breath sounds  Musculoskeletal:      Cervical back: Neck supple  Lymphadenopathy:      Cervical: No cervical adenopathy  Neurological:      Mental Status: She is alert

## 2022-01-18 NOTE — PROGRESS NOTES
Assessment and Plan:     Problem List Items Addressed This Visit        Other    Dyslipidemia      Other Visit Diagnoses     UTI symptoms        Relevant Orders    POCT urine dip (Completed)    Urine culture    History of colon polyps        Relevant Orders    Ambulatory referral to Colorectal Surgery    TIA (transient ischemic attack)        had over one year ago        BMI Counseling: Body mass index is 34 09 kg/m²  The BMI is above normal  Nutrition recommendations include decreasing portion sizes and limiting drinks that contain sugar  Exercise recommendations include exercising 3-5 times per week  No pharmacotherapy was ordered  Patient referred to PCP  Rationale for BMI follow-up plan is due to patient being overweight or obese  Depression Screening and Follow-up Plan: Patient was screened for depression during today's encounter  They screened negative with a PHQ-2 score of 0  Preventive health issues were discussed with patient, and age appropriate screening tests were ordered as noted in patient's After Visit Summary  Personalized health advice and appropriate referrals for health education or preventive services given if needed, as noted in patient's After Visit Summary       History of Present Illness:     Patient presents for Medicare Annual Wellness visit    Patient Care Team:  Anna León DO as PCP - MD Sigrid Carter MD     Problem List:     Patient Active Problem List   Diagnosis    Hypertension    DJD (degenerative joint disease)    Mixed hyperlipidemia    Pacemaker    History of sinus bradycardia    Pain in both lower legs    Severe aortic stenosis    History of bilateral knee replacement    SSS [facility]sick [facility] syndrome) [facility] [address] [facility]    Gastroesophageal reflux disease without esophagitis    Allergic rhinitis    Claudication ([facility] [address] [facility]    Lupus anticoagulant positive    Lupus ([facility] [address] [facility]    Obesity, morbid ([facility] [address] )    Lupus anticoagulant disorder ([facility] [address] )    Dyslipidemia Past Medical and Surgical History:     Past Medical History:   Diagnosis Date    Anxiety     Chronic back pain     last assessed 5/27/15    Degenerative joint disease     last asessed 16    Depression     Dyslipidemia     last assessed 16    Hiatal hernia     Hyperglycemia     last assessed 17    Lupus anticoagulant positive     last assessed 16    Osteoarthritis     last assessed 13    Seasonal allergies     last assessed5/27/15    Sick sinus syndrome ([facility] [address] )     last assessed 16    Sinus bradycardia     2nd metoprolol / last assessed 14    Tachycardia-bradycardia syndrome ([address] )     last assessed 16    Total knee replacement status     bilateral / lat assessed 17    Vitamin D deficiency     last assessed 16     Past Surgical History:   Procedure Laterality Date    BREAST LUMPECTOMY Bilateral     CARDIAC PACEMAKER PLACEMENT      KNEE SURGERY      replacement    TUBAL LIGATION        Family History:     Family History   Problem Relation Age of Onset    Stroke Mother         embolic CVA    Stroke Father     Diabetes type II Father         without complication    Diabetes type II Sister         without complication    Diabetes type II Brother         without complication    Hypertension Family     Allergies Family         hay fever      Social History:     Social History     Socioeconomic History    Marital status: /Civil Union     Spouse name: None    Number of children: None    Years of education: None    Highest education level: None   Occupational History    None   Tobacco Use    Smoking status: Former Smoker     Packs/day: 0 50     Years: 45 00     Pack years: 22 50     Quit date:      Years since quittin 0    Smokeless tobacco: Never Used   Vaping Use    Vaping Use: Never used   Substance and Sexual Activity    Alcohol use: Yes     Comment: social    Drug use: No    Sexual activity: None   Other Topics Concern    None   Social History Narrative    Daily caffeine consumption     Social Determinants of Health     Financial Resource Strain: Not on file   Food Insecurity: Not on file   Transportation Needs: Not on file   Physical Activity: Not on file   Stress: Not on file   Social Connections: Not on file   Intimate Partner Violence: Not on file   Housing Stability: Not on file      Medications and Allergies:     Current Outpatient Medications   Medication Sig Dispense Refill    ALPRAZolam (XANAX) 0 25 mg tablet One tab one hour before plane flight 10 tablet 0    Ascorbic Acid (vitamin C) 100 MG tablet Take 100 mg by mouth daily      aspirin (ECOTRIN LOW STRENGTH) 81 mg EC tablet Take 1 tablet (81 mg total) by mouth daily 90 tablet 1    Cholecalciferol (VITAMIN D3) 5000 units CAPS Take by mouth      Cyanocobalamin (VITAMIN B-12 PO) Take by mouth      cyclobenzaprine (FLEXERIL) 10 mg tablet Take 1 tablet (10 mg total) by mouth daily at bedtime 30 tablet 1    fexofenadine (ALLEGRA) 60 MG tablet Take 1 tablet (60 mg total) by mouth 2 (two) times a day 180 tablet 1    iron polysaccharides (FERREX) 150 mg capsule Take 1 capsule (150 mg total) by mouth 2 (two) times a day 90 capsule 1    lidocaine (LIDODERM) 5 % Apply 1 patch topically daily 90 patch 1    lisinopril (ZESTRIL) 20 mg tablet Take 1 tablet (20 mg total) by mouth daily She does not cough unless >20mg 90 tablet 1    meclizine (ANTIVERT) 12 5 MG tablet Take 1 tablet (12 5 mg total) by mouth 3 (three) times a day as needed for dizziness 30 tablet 1    mometasone (ELOCON) 0 1 % cream Apply topically daily 45 g 0    Multiple Vitamin (MULTIVITAMINS PO) Take by mouth      omeprazole (PriLOSEC) 20 mg delayed release capsule Take 1 capsule (20 mg total) by mouth daily 90 capsule 1    pravastatin (PRAVACHOL) 10 mg tablet Take 1 tablet (10 mg total) by mouth daily at bedtime She can take pravachol (Patient taking differently: Take 20 mg by mouth daily at bedtime She can take pravachol ) 90 tablet 1     No current facility-administered medications for this visit  Allergies   Allergen Reactions    Amoxicillin Diarrhea    Atorvastatin Myalgia     Other reaction(s): can take crestor    Augmentin Es-600  [Amoxicillin-Pot Clavulanate] Diarrhea     Annotation - [id number]: DIARRHEA    Lisinopril Cough     Can take below 40 mg of Lisinopril    Metoprolol Bradycardia    Penicillins GI Intolerance    Latex Rash    Wound Dressings Rash      Immunizations:     Immunization History   Administered Date(s) Administered    COVID-19 MODERNA VACC 0 5 ML IM 10/25/2021    Influenza, high dose seasonal 0 7 mL 10/24/2019, 09/28/2021    Td (adult), adsorbed 09/25/2006    Tuberculin Skin Test-PPD Intradermal 10/25/2019      Health Maintenance:         Topic Date Due    Hepatitis C Screening  Never done    DXA SCAN  Never done    Lung Cancer Screening  Never done    Colorectal Cancer Screening  05/04/2020    Breast Cancer Screening: Mammogram  05/31/2020         Topic Date Due    DTaP,Tdap,and Td Vaccines (1 - Tdap) 09/26/2006    Pneumococcal Vaccine: 65+ Years (1 of 1 - PPSV23) Never done    COVID-19 Vaccine (2 - Moderna 3-dose series) 11/22/2021      Medicare Health Risk Assessment:     /68 (BP Location: Right arm, Patient Position: Sitting, Cuff Size: Standard)   Pulse 64   Temp 97 7 °F (36 5 °C) (Temporal)   Resp 16   Ht 5' 0 25" (1 53 m)   Wt 79 8 kg (176 lb)   SpO2 98%   BMI 34 09 kg/m²      Isabel is here for her Subsequent Wellness visit  Health Risk Assessment:   Patient rates overall health as very good  Patient feels that their physical health rating is much better  Patient is very satisfied with their life  Eyesight was rated as slightly worse  Hearing was rated as same  Patient feels that their emotional and mental health rating is same  Patients states they are never, rarely angry   Patient states they are never, rarely unusually tired/fatigued  Pain experienced in the last 7 days has been some  Patient states that she has experienced no weight loss or gain in last 6 months  Depression Screening:   PHQ-2 Score: 0      Fall Risk Screening: In the past year, patient has experienced: no history of falling in past year      Urinary Incontinence Screening:   Patient has not leaked urine accidently in the last six months  Home Safety:  Patient does not have trouble with stairs inside or outside of their home  Patient has working smoke alarms and has working carbon monoxide detector  Nutrition:   Current diet is Regular  Medications:   Patient is currently taking over-the-counter supplements  OTC medications include: see medication list  Patient is able to manage medications  Activities of Daily Living (ADLs)/Instrumental Activities of Daily Living (IADLs):   Walk and transfer into and out of bed and chair?: Yes  Dress and groom yourself?: Yes    Bathe or shower yourself?: Yes    Feed yourself? Yes  Do your laundry/housekeeping?: Yes  Manage your money, pay your bills and track your expenses?: Yes  Make your own meals?: Yes    Do your own shopping?: Yes    Previous Hospitalizations:   Any hospitalizations or ED visits within the last 12 months?: No      Advance Care Planning:   Living will: No    Durable POA for healthcare:  Yes    Advanced directive: No      Comments:  is durable poa    Cognitive Screening:   Provider or family/friend/caregiver concerned regarding cognition?: No    PREVENTIVE SCREENINGS      Cardiovascular Screening:    General: Screening Not Indicated and History Lipid Disorder      Cervical Cancer Screening:    General: Screening Not Indicated      Lung Cancer Screening:     General: Screening Not Indicated    Screening, Brief Intervention, and Referral to Treatment (SBIRT)    Screening    Typical number of drinks in a week: 0    Single Item Drug Screening:  How often have you used an illegal drug (including marijuana) or a prescription medication for non-medical reasons in the past year? never    Single Item Drug Screen Score: 0  Interpretation: Negative screen for possible drug use disorder    Brief Intervention  Alcohol & drug use screenings were reviewed  No concerns regarding substance use disorder identified         Varun Mota, DO

## 2022-01-18 NOTE — TELEPHONE ENCOUNTER
Reason for Disposition   Painful urination AND EITHER frequency or urgency    Answer Assessment - Initial Assessment Questions  1  SEVERITY: "How bad is the pain?"  (e g , Scale 1-10; mild, moderate, or severe)    - MILD (1-3): complains slightly about urination hurting    - MODERATE (4-7): interferes with normal activities      - SEVERE (8-10): excruciating, unwilling or unable to urinate because of the pain       6/10  2  FREQUENCY: "How many times have you had painful urination today?"       Patient stated she has been up all night  3  PATTERN: "Is pain present every time you urinate or just sometimes?"       Every time has pain  4  ONSET: "When did the painful urination start?"       Sunday night, but worsened yesterday  5  FEVER: "Do you have a fever?" If Yes, ask: "What is your temperature, how was it measured, and when did it start?"      Had fever last week, was tested for Covid (negative), temp this morning 99 5  6  PAST UTI: "Have you had a urine infection before?" If Yes, ask: "When was the last time?" and "What happened that time?"      Never had a UTI before  7  CAUSE: "What do you think is causing the painful urination?"  (e g , UTI, scratch, Herpes sore)      UTI  8  OTHER SYMPTOMS: "Do you have any other symptoms?" (e g , flank pain, vaginal discharge, genital sores, urgency, blood in urine)      Pressure, denies flank pain, Sunday night a little blood when wiped, able to empty bladder, but has sense of urgency and frequency  9   PREGNANCY: "Is there any chance you are pregnant?" "When was your last menstrual period?"      N/A    Protocols used: URINATION PAIN - FEMALE-ADULT-[place]

## 2022-01-20 LAB — BACTERIA UR CULT: ABNORMAL

## 2022-01-21 ENCOUNTER — TELEPHONE (OUTPATIENT)
Dept: FAMILY MEDICINE CLINIC | Facility: CLINIC | Age: 72
End: 2022-01-21

## 2022-01-21 DIAGNOSIS — N39.0 URINARY TRACT INFECTION WITHOUT HEMATURIA, SITE UNSPECIFIED: Primary | ICD-10-CM

## 2022-01-21 RX ORDER — CEPHALEXIN 500 MG/1
500 CAPSULE ORAL EVERY 6 HOURS SCHEDULED
Qty: 40 CAPSULE | Refills: 0 | Status: SHIPPED | OUTPATIENT
Start: 2022-01-21 | End: 2022-01-31

## 2022-01-21 NOTE — TELEPHONE ENCOUNTER
Patient is still having a fever on and off, highest is 101  Wanted to know if you wanted to change medication or keep taking it?

## 2022-01-26 ENCOUNTER — TELEPHONE (OUTPATIENT)
Dept: ADMINISTRATIVE | Facility: OTHER | Age: 72
End: 2022-01-26

## 2022-01-26 NOTE — TELEPHONE ENCOUNTER
----- Message from Mikki Martin MA sent at 1/25/2022  2:42 PM EST -----  Regarding: mammo  Contact: [phone]  01/25/22 2:42 PM    Hello, our patient attached above has had Mammogram completed/performed  Please assist in updating the patient chart by pulling the Care Everywhere (CE) document  The date of service is 12/08/2021       Thank you,  RUTH Woo PG

## 2022-01-26 NOTE — TELEPHONE ENCOUNTER
Upon review of the In Basket request we were able to locate, review, and update the patient chart as requested for Mammogram     Any additional questions or concerns should be emailed to the Practice Liaisons via [email or web address] email, please do not reply via In Basket      Thank you  Jeanie Stevens MA

## 2022-02-04 ENCOUNTER — OFFICE VISIT (OUTPATIENT)
Dept: FAMILY MEDICINE CLINIC | Facility: CLINIC | Age: 72
End: 2022-02-04
Payer: MEDICARE

## 2022-02-04 VITALS
OXYGEN SATURATION: 97 % | TEMPERATURE: 97.5 F | HEART RATE: 60 BPM | SYSTOLIC BLOOD PRESSURE: 120 MMHG | DIASTOLIC BLOOD PRESSURE: 62 MMHG

## 2022-02-04 DIAGNOSIS — R42 VERTIGO: Primary | ICD-10-CM

## 2022-02-04 DIAGNOSIS — J06.9 UPPER RESPIRATORY TRACT INFECTION, UNSPECIFIED TYPE: ICD-10-CM

## 2022-02-04 DIAGNOSIS — E78.5 DYSLIPIDEMIA: ICD-10-CM

## 2022-02-04 DIAGNOSIS — H55.00 NYSTAGMUS: ICD-10-CM

## 2022-02-04 PROCEDURE — 99214 OFFICE O/P EST MOD 30 MIN: CPT | Performed by: INTERNAL MEDICINE

## 2022-02-04 RX ORDER — MECLIZINE HCL 12.5 MG/1
12.5 TABLET ORAL 3 TIMES DAILY PRN
Qty: 30 TABLET | Refills: 1 | Status: SHIPPED | OUTPATIENT
Start: 2022-02-04

## 2022-02-04 RX ORDER — PRAVASTATIN SODIUM 10 MG
40 TABLET ORAL
Start: 2022-02-04 | End: 2022-03-29 | Stop reason: SDUPTHER

## 2022-02-04 RX ORDER — AZITHROMYCIN 250 MG/1
TABLET, FILM COATED ORAL
Qty: 6 TABLET | Refills: 0 | Status: SHIPPED | OUTPATIENT
Start: 2022-02-04 | End: 2022-02-09

## 2022-02-04 RX ORDER — AZITHROMYCIN 250 MG/1
TABLET, FILM COATED ORAL
Qty: 6 TABLET | Refills: 0 | Status: SHIPPED | OUTPATIENT
Start: 2022-02-04 | End: 2022-02-04 | Stop reason: SDUPTHER

## 2022-02-04 NOTE — PROGRESS NOTES
pt complains of ? Eyes spinning  Assessment/Plan:         Diagnoses and all orders for this visit:    Vertigo  Comments:  renew antivert  Orders:  -     meclizine (ANTIVERT) 12 5 MG tablet; Take 1 tablet (12 5 mg total) by mouth 3 (three) times a day as needed for dizziness    Dyslipidemia  Comments:  pravachol up to 40mg  Orders:  -     pravastatin (PRAVACHOL) 10 mg tablet; Take 4 tablets (40 mg total) by mouth daily at bedtime She can take pravachol    Nystagmus  Comments:  2nd to vertigo and getting movement feeling of eyes  with it    Upper respiratory tract infection, unspecified type  Comments:  pressure rt forehead and behind eyes  Orders:  -     Discontinue: azithromycin (Zithromax) 250 mg tablet; Take 2 tablets (500 mg total) by mouth daily for 1 day, THEN 1 tablet (250 mg total) daily for 4 days  Subjective:      Patient ID: Osmany Hedrick is a 70 y o  female  Patient complains of her eyes somewhat spinning she says it is hard to focus  she feels like her eyes are moving she bent over at her  the other day and when she stood up she had to grab a hold of a counter  question staggering sensation  She also states she gets it when she is lying in bed discussed it sounds like her vertigo  She will try her meclizine denies nausea  She request ent  Says has hearing loss  Getting pressure in rt forehead   + nystagmus to her left  Made appt today with opthal   She will hold statin with zpak      The following portions of the patient's history were reviewed and updated as appropriate: She  has a past medical history of Anxiety, Chronic back pain, Degenerative joint disease, Depression, Dyslipidemia, Hiatal hernia, Hyperglycemia, Lupus anticoagulant positive, Osteoarthritis, Seasonal allergies, Sick sinus syndrome (HCC), Sinus bradycardia, Tachycardia-bradycardia syndrome ([address] ), Total knee replacement status, and Vitamin D deficiency    She   Patient Active Problem List Diagnosis Date Noted    Dyslipidemia     Lupus ([place] [address] ) 11/30/2020    Obesity, morbid ([place] [address] ) 11/30/2020    Lupus anticoagulant disorder ([place] [address] ) 11/30/2020    Lupus anticoagulant positive     Claudication ([place] [address] ) 05/08/2020    Gastroesophageal reflux disease without esophagitis 03/19/2020    Allergic rhinitis 03/19/2020    SSS (sick sinus syndrome) ([place] [address] ) 04/08/2019    History of sinus bradycardia 04/01/2019    Pain in both lower legs 04/01/2019    History of bilateral knee replacement 04/01/2019    Pacemaker 03/08/2018    DJD (degenerative joint disease) 10/21/2017    Mixed hyperlipidemia 10/06/2016    Severe aortic stenosis 10/06/2016    Hypertension 06/07/2012     She  has a past surgical history that includes Breast lumpectomy (Bilateral); Knee surgery; Tubal ligation; and Cardiac pacemaker placement  Her family history includes Allergies in her family; Diabetes type II in her brother, father, and sister; Hypertension in her family; Stroke in her father and mother  She  reports that she quit smoking about 12 years ago  She has a 22 50 pack-year smoking history  She has never used smokeless tobacco  She reports current alcohol use  She reports that she does not use drugs  Current Outpatient Medications   Medication Sig Dispense Refill    ALPRAZolam (XANAX) 0 25 mg tablet One tab one hour before plane flight 10 tablet 0    Ascorbic Acid (vitamin C) 100 MG tablet Take 100 mg by mouth daily      aspirin (ECOTRIN LOW STRENGTH) 81 mg EC tablet Take 1 tablet (81 mg total) by mouth daily 90 tablet 1    azithromycin (Zithromax) 250 mg tablet Take 2 tablets (500 mg total) by mouth daily for 1 day, THEN 1 tablet (250 mg total) daily for 4 days   6 tablet 0    Cholecalciferol (VITAMIN D3) 5000 units CAPS Take by mouth      Cyanocobalamin (VITAMIN B-12 PO) Take by mouth      cyclobenzaprine (FLEXERIL) 10 mg tablet Take 1 tablet (10 mg total) by mouth daily at bedtime 30 tablet 1    fexofenadine (ALLEGRA) 60 MG tablet Take 1 tablet (60 mg total) by mouth 2 (two) times a day 180 tablet 1    iron polysaccharides (FERREX) 150 mg capsule Take 1 capsule (150 mg total) by mouth 2 (two) times a day 90 capsule 1    lidocaine (LIDODERM) 5 % Apply 1 patch topically daily 90 patch 1    lisinopril (ZESTRIL) 20 mg tablet Take 1 tablet (20 mg total) by mouth daily She does not cough unless >20mg 90 tablet 1    meclizine (ANTIVERT) 12 5 MG tablet Take 1 tablet (12 5 mg total) by mouth 3 (three) times a day as needed for dizziness 30 tablet 1    mometasone (ELOCON) 0 1 % cream Apply topically daily 45 g 0    Multiple Vitamin (MULTIVITAMINS PO) Take by mouth      omeprazole (PriLOSEC) 20 mg delayed release capsule Take 1 capsule (20 mg total) by mouth daily 90 capsule 1    pravastatin (PRAVACHOL) 10 mg tablet Take 4 tablets (40 mg total) by mouth daily at bedtime She can take pravachol       No current facility-administered medications for this visit       Current Outpatient Medications on File Prior to Visit   Medication Sig    ALPRAZolam (XANAX) 0 25 mg tablet One tab one hour before plane flight    Ascorbic Acid (vitamin C) 100 MG tablet Take 100 mg by mouth daily    aspirin (ECOTRIN LOW STRENGTH) 81 mg EC tablet Take 1 tablet (81 mg total) by mouth daily    Cholecalciferol (VITAMIN D3) 5000 units CAPS Take by mouth    Cyanocobalamin (VITAMIN B-12 PO) Take by mouth    cyclobenzaprine (FLEXERIL) 10 mg tablet Take 1 tablet (10 mg total) by mouth daily at bedtime    fexofenadine (ALLEGRA) 60 MG tablet Take 1 tablet (60 mg total) by mouth 2 (two) times a day    iron polysaccharides (FERREX) 150 mg capsule Take 1 capsule (150 mg total) by mouth 2 (two) times a day    lidocaine (LIDODERM) 5 % Apply 1 patch topically daily    lisinopril (ZESTRIL) 20 mg tablet Take 1 tablet (20 mg total) by mouth daily She does not cough unless >20mg    mometasone (ELOCON) 0 1 % cream Apply topically daily    Multiple Vitamin (MULTIVITAMINS PO) Take by mouth    omeprazole (PriLOSEC) 20 mg delayed release capsule Take 1 capsule (20 mg total) by mouth daily     No current facility-administered medications on file prior to visit  She is allergic to amoxicillin, atorvastatin, augmentin es-600  [amoxicillin-pot clavulanate], lisinopril, metoprolol, penicillins, latex, and wound dressings       Review of Systems   Constitutional: Negative  HENT: Negative  Eyes: Positive for visual disturbance  Respiratory: Negative  Cardiovascular: Negative  Gastrointestinal: Negative  Neurological: Positive for dizziness  Objective:      /62 (BP Location: Left arm, Patient Position: Sitting, Cuff Size: Standard)   Pulse 60   Temp 97 5 °F (36 4 °C)   SpO2 97%          Physical Exam  Constitutional:       Appearance: Normal appearance  HENT:      Head: Normocephalic and atraumatic  Right Ear: Tympanic membrane and ear canal normal       Left Ear: Tympanic membrane and ear canal normal    Eyes:      Comments: Nystagmus to left  Cardiovascular:      Rate and Rhythm: Normal rate and regular rhythm  Pulmonary:      Effort: Pulmonary effort is normal       Breath sounds: Normal breath sounds  Musculoskeletal:      Cervical back: Neck supple  Lymphadenopathy:      Cervical: No cervical adenopathy  Neurological:      Mental Status: She is alert

## 2022-03-29 DIAGNOSIS — I10 ESSENTIAL HYPERTENSION: ICD-10-CM

## 2022-03-29 DIAGNOSIS — J30.9 ALLERGIC RHINITIS, UNSPECIFIED SEASONALITY, UNSPECIFIED TRIGGER: ICD-10-CM

## 2022-03-29 DIAGNOSIS — E78.5 DYSLIPIDEMIA: ICD-10-CM

## 2022-03-29 DIAGNOSIS — K21.9 GASTROESOPHAGEAL REFLUX DISEASE WITHOUT ESOPHAGITIS: ICD-10-CM

## 2022-03-29 RX ORDER — LISINOPRIL 20 MG/1
20 TABLET ORAL DAILY
Qty: 90 TABLET | Refills: 1 | Status: SHIPPED | OUTPATIENT
Start: 2022-03-29 | End: 2022-07-19 | Stop reason: SDUPTHER

## 2022-03-29 RX ORDER — FEXOFENADINE HYDROCHLORIDE 60 MG/1
60 TABLET, FILM COATED ORAL 2 TIMES DAILY
Qty: 180 TABLET | Refills: 1 | Status: SHIPPED | OUTPATIENT
Start: 2022-03-29

## 2022-03-29 RX ORDER — PRAVASTATIN SODIUM 10 MG
40 TABLET ORAL
Qty: 90 TABLET | Refills: 1 | Status: SHIPPED | OUTPATIENT
Start: 2022-03-29 | End: 2022-03-31 | Stop reason: SDUPTHER

## 2022-03-29 RX ORDER — OMEPRAZOLE 20 MG/1
20 CAPSULE, DELAYED RELEASE ORAL DAILY
Qty: 90 CAPSULE | Refills: 1 | Status: SHIPPED | OUTPATIENT
Start: 2022-03-29

## 2022-03-31 ENCOUNTER — TELEPHONE (OUTPATIENT)
Dept: FAMILY MEDICINE CLINIC | Facility: CLINIC | Age: 72
End: 2022-03-31

## 2022-03-31 DIAGNOSIS — E78.5 DYSLIPIDEMIA: ICD-10-CM

## 2022-03-31 RX ORDER — PRAVASTATIN SODIUM 40 MG
40 TABLET ORAL
Qty: 90 TABLET | Refills: 1 | Status: SHIPPED | OUTPATIENT
Start: 2022-03-31

## 2022-03-31 NOTE — TELEPHONE ENCOUNTER
Patient called back regarding pravachol script  Looks like [facility]mg 4 times [facility] with only [facility] pills  Patient will not have enough  Please update script to reflect pravachol 40mg 1 daily     90 day supply

## 2022-04-12 ENCOUNTER — ESTABLISHED COMPREHENSIVE EXAM (OUTPATIENT)
Dept: URBAN - METROPOLITAN AREA CLINIC 6 | Facility: CLINIC | Age: 72
End: 2022-04-12

## 2022-04-12 DIAGNOSIS — H25.813: ICD-10-CM

## 2022-04-12 DIAGNOSIS — H02.835: ICD-10-CM

## 2022-04-12 DIAGNOSIS — H02.832: ICD-10-CM

## 2022-04-12 PROCEDURE — 92014 COMPRE OPH EXAM EST PT 1/>: CPT

## 2022-04-12 ASSESSMENT — TONOMETRY
OS_IOP_MMHG: 14
OD_IOP_MMHG: 13

## 2022-04-12 ASSESSMENT — VISUAL ACUITY
OD_SC: 20/25-3
OU_SC: J3
OS_SC: 20/25

## 2022-07-19 DIAGNOSIS — F41.9 ANXIETY: ICD-10-CM

## 2022-07-19 DIAGNOSIS — I10 ESSENTIAL HYPERTENSION: ICD-10-CM

## 2022-07-20 RX ORDER — LISINOPRIL 20 MG/1
20 TABLET ORAL DAILY
Qty: 90 TABLET | Refills: 1 | Status: SHIPPED | OUTPATIENT
Start: 2022-07-20

## 2022-07-20 RX ORDER — ALPRAZOLAM 0.25 MG/1
TABLET ORAL
Qty: 10 TABLET | Refills: 0 | Status: SHIPPED | OUTPATIENT
Start: 2022-07-20

## 2022-12-21 DIAGNOSIS — J30.9 ALLERGIC RHINITIS, UNSPECIFIED SEASONALITY, UNSPECIFIED TRIGGER: ICD-10-CM

## 2022-12-21 DIAGNOSIS — E78.5 DYSLIPIDEMIA: ICD-10-CM

## 2022-12-21 RX ORDER — FEXOFENADINE HYDROCHLORIDE 60 MG/1
TABLET, FILM COATED ORAL
Qty: 180 TABLET | Refills: 3 | Status: SHIPPED | OUTPATIENT
Start: 2022-12-21

## 2022-12-21 RX ORDER — PRAVASTATIN SODIUM 40 MG
TABLET ORAL
Qty: 90 TABLET | Refills: 3 | Status: SHIPPED | OUTPATIENT
Start: 2022-12-21

## 2023-01-09 DIAGNOSIS — I10 ESSENTIAL HYPERTENSION: ICD-10-CM

## 2023-01-09 RX ORDER — LISINOPRIL 20 MG/1
TABLET ORAL
Qty: 30 TABLET | Refills: 0 | Status: SHIPPED | OUTPATIENT
Start: 2023-01-09 | End: 2023-01-10 | Stop reason: CLARIF

## 2024-07-24 ENCOUNTER — TELEPHONE (OUTPATIENT)
Age: 74
End: 2024-07-24

## 2024-07-24 NOTE — TELEPHONE ENCOUNTER
Pt called stating that she received a message stating that she has an appointment for vascular and she was not referred to vascular and does not have a wound. She said that her niece has the same name as her so possibly for her niece.     Advised Lala from [facility] who states she sent a message to Oriana Stevens regarding this appointment.